# Patient Record
Sex: FEMALE | Race: WHITE | NOT HISPANIC OR LATINO | Employment: FULL TIME | URBAN - METROPOLITAN AREA
[De-identification: names, ages, dates, MRNs, and addresses within clinical notes are randomized per-mention and may not be internally consistent; named-entity substitution may affect disease eponyms.]

---

## 2017-11-21 ENCOUNTER — TRANSCRIBE ORDERS (OUTPATIENT)
Dept: ADMINISTRATIVE | Facility: HOSPITAL | Age: 47
End: 2017-11-21

## 2017-11-21 DIAGNOSIS — Z12.31 VISIT FOR SCREENING MAMMOGRAM: Primary | ICD-10-CM

## 2017-12-04 ENCOUNTER — OFFICE VISIT (OUTPATIENT)
Dept: OBSTETRICS AND GYNECOLOGY | Facility: CLINIC | Age: 47
End: 2017-12-04

## 2017-12-04 VITALS — DIASTOLIC BLOOD PRESSURE: 80 MMHG | WEIGHT: 176.8 LBS | SYSTOLIC BLOOD PRESSURE: 126 MMHG

## 2017-12-04 DIAGNOSIS — Z11.51 SPECIAL SCREENING EXAMINATION FOR HUMAN PAPILLOMAVIRUS (HPV): ICD-10-CM

## 2017-12-04 DIAGNOSIS — Z13.9 SCREENING FOR CONDITION: Primary | ICD-10-CM

## 2017-12-04 DIAGNOSIS — Z00.00 ANNUAL PHYSICAL EXAM: ICD-10-CM

## 2017-12-04 LAB
B-HCG UR QL: NEGATIVE
BILIRUB BLD-MCNC: NEGATIVE MG/DL
CLARITY, POC: CLEAR
COLOR UR: YELLOW
GLUCOSE UR STRIP-MCNC: NEGATIVE MG/DL
INTERNAL NEGATIVE CONTROL: NEGATIVE
INTERNAL POSITIVE CONTROL: POSITIVE
KETONES UR QL: NEGATIVE
LEUKOCYTE EST, POC: NEGATIVE
Lab: NORMAL
NITRITE UR-MCNC: NEGATIVE MG/ML
PH UR: 5 [PH] (ref 5–8)
PROT UR STRIP-MCNC: NEGATIVE MG/DL
RBC # UR STRIP: NEGATIVE /UL
SP GR UR: 1 (ref 1–1.03)
UROBILINOGEN UR QL: NORMAL

## 2017-12-04 PROCEDURE — 81025 URINE PREGNANCY TEST: CPT | Performed by: OBSTETRICS & GYNECOLOGY

## 2017-12-04 PROCEDURE — 81002 URINALYSIS NONAUTO W/O SCOPE: CPT | Performed by: OBSTETRICS & GYNECOLOGY

## 2017-12-04 PROCEDURE — 99396 PREV VISIT EST AGE 40-64: CPT | Performed by: OBSTETRICS & GYNECOLOGY

## 2017-12-04 RX ORDER — INFLUENZA VIRUS VACCINE 15; 15; 15; 15 UG/.5ML; UG/.5ML; UG/.5ML; UG/.5ML
SUSPENSION INTRAMUSCULAR
Refills: 0 | COMMUNITY
Start: 2017-10-07 | End: 2022-10-24

## 2017-12-04 RX ORDER — ESCITALOPRAM OXALATE 20 MG/1
TABLET ORAL
COMMUNITY
Start: 2017-11-23 | End: 2019-07-01 | Stop reason: SDUPTHER

## 2017-12-04 RX ORDER — PROPRANOLOL HYDROCHLORIDE 10 MG/1
10 TABLET ORAL
COMMUNITY
Start: 2017-12-04 | End: 2022-10-24

## 2017-12-04 RX ORDER — FLUTICASONE PROPIONATE 50 MCG
SPRAY, SUSPENSION (ML) NASAL
COMMUNITY
End: 2022-10-24

## 2017-12-04 NOTE — PROGRESS NOTES
GYN Annual Exam     CC- Here for annual exam.     Pt new to practice? no  Pt new to me? no           Yuki Fermin is a 47 y.o. female who presents for annual well woman exam.       Problems:  There is no problem list on file for this patient.  ; otherwise none    OB History      Para Term  AB Living      0       SAB TAB Ectopic Multiple Live Births                    Past Medical History:   Diagnosis Date   • Depression    • Palpitations        Past Surgical History:   Procedure Laterality Date   • CERVICAL BIOPSY  W/ LOOP ELECTRODE EXCISION     • RHINOPLASTY           Current Outpatient Prescriptions:   •  escitalopram (LEXAPRO) 20 MG tablet, TAKE 1 TABLET BY MOUTH EVERY DAY, Disp: , Rfl:   •  propranolol (INDERAL) 10 MG tablet, Take 10 mg by mouth., Disp: , Rfl:   •  FLUARIX QUADRIVALENT 0.5 ML suspension prefilled syringe injection, TO BE ADMINISTERED BY PHARMACIST FOR IMMUNIZATION, Disp: , Rfl: 0  •  fluticasone (FLONASE) 50 MCG/ACT nasal spray, into each nostril., Disp: , Rfl:     Allergies   Allergen Reactions   • Penicillins Itching   • Ciprofloxacin Rash       Social History   Substance Use Topics   • Smoking status: None   • Smokeless tobacco: None   • Alcohol use None     Counseling given: Not Answered      No family history on file.          /80  Wt 176 lb 12.8 oz (80.2 kg)  LMP 11/10/2017 (Exact Date)  Breastfeeding? No    Physical Exam   Constitutional: She is oriented to person, place, and time. She appears well-developed and well-nourished. No distress.   HENT:   Head: Normocephalic and atraumatic.   Eyes: EOM are normal.   Neck: Normal range of motion. Neck supple. No JVD present. No tracheal deviation present. No thyromegaly present.   Cardiovascular: Exam reveals no gallop and no friction rub.    No murmur heard.  Pulmonary/Chest: Effort normal. No stridor. No respiratory distress. She has no wheezes. She has no rales. She exhibits no tenderness.   Abdominal: Bowel  sounds are normal. She exhibits no distension and no mass. There is no tenderness. There is no rebound and no guarding. No hernia.   Genitourinary: Vagina normal and uterus normal. No vaginal discharge found.   Genitourinary Comments: cx wnl, pap done   Musculoskeletal: Normal range of motion. She exhibits no edema, tenderness or deformity.   Lymphadenopathy:     She has no cervical adenopathy.   Neurological: She is alert and oriented to person, place, and time.   Skin: Skin is warm and dry. No rash noted. She is not diaphoretic. No erythema. No pallor.   Breasts normal bilaterally   Psychiatric: She has a normal mood and affect. Her behavior is normal. Judgment and thought content normal.   Nursing note and vitals reviewed.       As part of wellness and prevention, the following topics were discussed with the patient:  []  Nutrition  []  Physical activity/regular exercise   []  Healthy weight  []  Injury prevention  []  Substance misuse/abuse  []  Sexual behavior  []  STD prevention  []  Contaception  []  Dental health  []  Mental health  []  Immunization  [x]  Encouraged SBE     Counseling and guidance done:  Nutrition, physical activity, healthy weight, injury prevention, misuse of tobacco, alcohol and drugs, sexual behavior and STDs, contraception, dental health, mental health, immunizations breast cancer screening and exams.      Assessment     1) GYN annual well woman exam.   2) PAP done today? yes  3) problems: none       Plan       Follow up prn and one year.    Yuki was seen today for gynecologic exam.    Diagnoses and all orders for this visit:    Screening for condition  -     POC Urinalysis Dipstick  -     POC Pregnancy, Urine    Special screening examination for human papillomavirus (HPV)  -     Pap IG, HPV-hr - ThinPrep Vial, Cervix        RTO No Follow-up on file.    Juan Elmore MD    12/4/2017  3:08 PM

## 2017-12-07 LAB
CYTOLOGIST CVX/VAG CYTO: NORMAL
CYTOLOGY CVX/VAG DOC THIN PREP: NORMAL
DX ICD CODE: NORMAL
HIV 1 & 2 AB SER-IMP: NORMAL
HPV I/H RISK 1 DNA CVX QL PROBE+SIG AMP: NEGATIVE
Lab: NORMAL
OTHER STN SPEC: NORMAL
PATH REPORT.FINAL DX SPEC: NORMAL
STAT OF ADQ CVX/VAG CYTO-IMP: NORMAL

## 2017-12-08 ENCOUNTER — HOSPITAL ENCOUNTER (OUTPATIENT)
Dept: MAMMOGRAPHY | Facility: HOSPITAL | Age: 47
Discharge: HOME | End: 2017-12-08
Attending: OBSTETRICS & GYNECOLOGY | Admitting: OBSTETRICS & GYNECOLOGY

## 2017-12-08 DIAGNOSIS — Z12.31 VISIT FOR SCREENING MAMMOGRAM: ICD-10-CM

## 2017-12-08 PROCEDURE — G0202 SCR MAMMO BI INCL CAD: HCPCS

## 2017-12-11 ENCOUNTER — TELEPHONE (OUTPATIENT)
Dept: OBSTETRICS AND GYNECOLOGY | Facility: CLINIC | Age: 47
End: 2017-12-11

## 2018-12-10 ENCOUNTER — OFFICE VISIT (OUTPATIENT)
Dept: OBSTETRICS AND GYNECOLOGY | Facility: CLINIC | Age: 48
End: 2018-12-10

## 2018-12-10 VITALS
BODY MASS INDEX: 27.25 KG/M2 | HEIGHT: 69 IN | DIASTOLIC BLOOD PRESSURE: 80 MMHG | SYSTOLIC BLOOD PRESSURE: 120 MMHG | WEIGHT: 184 LBS

## 2018-12-10 DIAGNOSIS — Z01.419 WELL WOMAN EXAM: Primary | ICD-10-CM

## 2018-12-10 LAB
BILIRUB BLD-MCNC: NEGATIVE MG/DL
CLARITY, POC: CLEAR
COLOR UR: YELLOW
GLUCOSE UR STRIP-MCNC: NEGATIVE MG/DL
KETONES UR QL: NEGATIVE
LEUKOCYTE EST, POC: NEGATIVE
NITRITE UR-MCNC: NEGATIVE MG/ML
PH UR: 5 [PH] (ref 5–8)
PROT UR STRIP-MCNC: NEGATIVE MG/DL
RBC # UR STRIP: NEGATIVE /UL
SP GR UR: 1 (ref 1–1.03)
UROBILINOGEN UR QL: NORMAL

## 2018-12-10 PROCEDURE — 99396 PREV VISIT EST AGE 40-64: CPT | Performed by: OBSTETRICS & GYNECOLOGY

## 2018-12-10 PROCEDURE — 81002 URINALYSIS NONAUTO W/O SCOPE: CPT | Performed by: OBSTETRICS & GYNECOLOGY

## 2018-12-10 NOTE — PROGRESS NOTES
"GYN Annual Exam     CC- Here for annual exam.     Pt new to practice? no  Pt new to me? no     HPI: History of Present Illness      Yuki Fermin is a 48 y.o. female who presents for annual well woman exam. No LMP recorded.      Problems:  There is no problem list on file for this patient.  ; otherwise none    OB History      Para Term  AB Living        0          SAB TAB Ectopic Molar Multiple Live Births                           Past Medical History:   Diagnosis Date   • Depression    • Palpitations        Past Surgical History:   Procedure Laterality Date   • CERVICAL BIOPSY  W/ LOOP ELECTRODE EXCISION     • RHINOPLASTY           Current Outpatient Medications:   •  escitalopram (LEXAPRO) 20 MG tablet, TAKE 1 TABLET BY MOUTH EVERY DAY, Disp: , Rfl:   •  FLUARIX QUADRIVALENT 0.5 ML suspension prefilled syringe injection, TO BE ADMINISTERED BY PHARMACIST FOR IMMUNIZATION, Disp: , Rfl: 0  •  fluticasone (FLONASE) 50 MCG/ACT nasal spray, into each nostril., Disp: , Rfl:   •  propranolol (INDERAL) 10 MG tablet, Take 10 mg by mouth., Disp: , Rfl:     Allergies   Allergen Reactions   • Penicillins Itching   • Ciprofloxacin Rash       Social History     Tobacco Use   • Smoking status: Not on file   Substance Use Topics   • Alcohol use: Not on file   • Drug use: Not on file     Counseling given: Not Answered      History reviewed. No pertinent family history.    Review of Systems    /80   Ht 175.3 cm (69\")   Wt 83.5 kg (184 lb)   BMI 27.17 kg/m²     Physical Exam   Constitutional: She is oriented to person, place, and time. She appears well-developed and well-nourished.   HENT:   Head: Normocephalic and atraumatic.   Eyes: EOM are normal.   Neck: Normal range of motion. Neck supple. No thyromegaly present.   Cardiovascular: Normal rate and regular rhythm.   Pulmonary/Chest: Effort normal and breath sounds normal. Right breast exhibits no mass and no nipple discharge. Left breast exhibits no " mass and no nipple discharge. Breasts are symmetrical. There is no breast swelling.   Abdominal: Soft. Bowel sounds are normal. She exhibits no distension and no mass. There is no tenderness. There is no rebound and no guarding.   Genitourinary: Vagina normal and uterus normal. No breast tenderness, discharge or bleeding. Pelvic exam was performed with patient prone. There is no lesion on the right labia. There is no lesion on the left labia. Cervix exhibits no motion tenderness and no discharge. Right adnexum displays no mass. Left adnexum displays no mass.   Genitourinary Comments: cx wnl, pap not done   Musculoskeletal: Normal range of motion. She exhibits no edema.   Neurological: She is alert and oriented to person, place, and time.   Skin: Skin is warm and dry.   Breasts wnl bilaterally   Psychiatric: She has a normal mood and affect. Her behavior is normal. Judgment and thought content normal.   Nursing note and vitals reviewed.         As part of wellness and prevention, the following topics were discussed with the patient:  []  Nutrition  []  Physical activity/regular exercise   []  Healthy weight  []  Injury prevention  []  Substance misuse/abuse  []  Sexual behavior  []  STD prevention  []  Contaception  []  Dental health  []  Mental health  []  Immunization  [x]  Encouraged SBE     Counseling and guidance done:  Nutrition, physical activity, healthy weight, injury prevention, misuse of tobacco, alcohol and drugs, sexual behavior and STDs, contraception, dental health, mental health, immunizations breast cancer screening and exams.    Assessment     1) GYN annual well woman exam.   2) PAP done today? no  3) problems: none       Plan       Follow up prn and one year.    Yuki was seen today for gynecologic exam.    Diagnoses and all orders for this visit:    Well woman exam  -     POC Urinalysis Dipstick  -     Mammo Screening Digital Tomosynthesis Bilateral With CAD; Future        RTO Return in about 1  year (around 12/10/2019) for Annual physical.        Juan Elmore MD    12/10/2018  9:38 AM

## 2019-02-04 ENCOUNTER — HOSPITAL ENCOUNTER (OUTPATIENT)
Dept: MAMMOGRAPHY | Facility: HOSPITAL | Age: 49
Discharge: HOME | End: 2019-02-04
Admitting: OBSTETRICS & GYNECOLOGY

## 2019-02-04 DIAGNOSIS — Z01.419 WELL WOMAN EXAM: ICD-10-CM

## 2019-02-04 PROCEDURE — 77067 SCR MAMMO BI INCL CAD: CPT

## 2019-02-04 PROCEDURE — 77063 BREAST TOMOSYNTHESIS BI: CPT

## 2019-07-01 ENCOUNTER — TELEPHONE (OUTPATIENT)
Dept: OBSTETRICS AND GYNECOLOGY | Facility: CLINIC | Age: 49
End: 2019-07-01

## 2019-07-01 RX ORDER — ESCITALOPRAM OXALATE 20 MG/1
20 TABLET ORAL DAILY
Qty: 30 TABLET | Refills: 1 | Status: SHIPPED | OUTPATIENT
Start: 2019-07-01 | End: 2019-08-22 | Stop reason: SDUPTHER

## 2019-07-01 NOTE — TELEPHONE ENCOUNTER
Pt called stating her therapist is moving out of state, she wants to know if you would be willing to write lexapro for her until she can find someone new. She will make an appt with you if she needs to.

## 2019-08-22 RX ORDER — ESCITALOPRAM OXALATE 20 MG/1
20 TABLET ORAL DAILY
Qty: 30 TABLET | Refills: 1 | Status: SHIPPED | OUTPATIENT
Start: 2019-08-22 | End: 2019-09-30 | Stop reason: SDUPTHER

## 2019-09-30 RX ORDER — ESCITALOPRAM OXALATE 20 MG/1
20 TABLET ORAL DAILY
Qty: 30 TABLET | Refills: 1 | Status: SHIPPED | OUTPATIENT
Start: 2019-09-30 | End: 2022-10-24

## 2022-10-24 ENCOUNTER — PREP FOR SURGERY (OUTPATIENT)
Dept: SURGERY | Facility: SURGERY CENTER | Age: 52
End: 2022-10-24

## 2022-10-24 ENCOUNTER — OFFICE VISIT (OUTPATIENT)
Dept: GASTROENTEROLOGY | Facility: CLINIC | Age: 52
End: 2022-10-24

## 2022-10-24 VITALS
HEART RATE: 91 BPM | TEMPERATURE: 97.3 F | HEIGHT: 69 IN | OXYGEN SATURATION: 99 % | BODY MASS INDEX: 25.09 KG/M2 | WEIGHT: 169.4 LBS | SYSTOLIC BLOOD PRESSURE: 138 MMHG | DIASTOLIC BLOOD PRESSURE: 84 MMHG

## 2022-10-24 DIAGNOSIS — Z86.010 HISTORY OF COLON POLYPS: Primary | ICD-10-CM

## 2022-10-24 DIAGNOSIS — D36.9 TUBULOVILLOUS ADENOMA: ICD-10-CM

## 2022-10-24 DIAGNOSIS — Z86.010 HISTORY OF COLON POLYPS: ICD-10-CM

## 2022-10-24 DIAGNOSIS — K59.00 CONSTIPATION, UNSPECIFIED CONSTIPATION TYPE: Primary | ICD-10-CM

## 2022-10-24 PROBLEM — Z86.0100 HISTORY OF COLON POLYPS: Status: ACTIVE | Noted: 2022-10-24

## 2022-10-24 PROCEDURE — 99204 OFFICE O/P NEW MOD 45 MIN: CPT | Performed by: NURSE PRACTITIONER

## 2022-10-24 RX ORDER — SODIUM CHLORIDE, SODIUM LACTATE, POTASSIUM CHLORIDE, CALCIUM CHLORIDE 600; 310; 30; 20 MG/100ML; MG/100ML; MG/100ML; MG/100ML
30 INJECTION, SOLUTION INTRAVENOUS CONTINUOUS PRN
Status: CANCELLED | OUTPATIENT
Start: 2022-10-24

## 2022-10-24 RX ORDER — QUETIAPINE FUMARATE 50 MG/1
TABLET, FILM COATED ORAL
COMMUNITY
Start: 2022-08-22

## 2022-10-24 RX ORDER — SODIUM CHLORIDE 0.9 % (FLUSH) 0.9 %
3 SYRINGE (ML) INJECTION EVERY 12 HOURS SCHEDULED
Status: CANCELLED | OUTPATIENT
Start: 2022-10-24

## 2022-10-24 RX ORDER — SODIUM CHLORIDE 0.9 % (FLUSH) 0.9 %
10 SYRINGE (ML) INJECTION AS NEEDED
Status: CANCELLED | OUTPATIENT
Start: 2022-10-24

## 2022-10-24 RX ORDER — DESVENLAFAXINE SUCCINATE 50 MG/1
TABLET, EXTENDED RELEASE ORAL
COMMUNITY
Start: 2022-08-22

## 2022-10-24 RX ORDER — ARIPIPRAZOLE 10 MG/1
TABLET ORAL
COMMUNITY
Start: 2022-10-06

## 2022-10-24 RX ORDER — FLUTICASONE PROPIONATE 50 MCG
1 SPRAY, SUSPENSION (ML) NASAL
COMMUNITY

## 2022-10-24 NOTE — PATIENT INSTRUCTIONS
For constipation, recommend starting MiraLAX daily available over-the-counter.  Mix 1 capful in 8 ounces of noncarbonated liquid and drink once daily.     Schedule colonoscopy for further evaluation.

## 2022-10-24 NOTE — PROGRESS NOTES
"Chief Complaint   Patient presents with   • Constipation         History of Present Illness  Patient is a 52-year-old female who presents today for evaluation.    Patient presents today with concerns about constipation.  Reports this began June 2022.  She is currently being treated for anxiety and depression and feels constipation has corresponded with medication she is taking for this.  She can go up to 3 days without having a bowel movement.  When she is constipated she has lower abdominal pain and bloating that improves after a bowel movement.    She denies any blood in the stool.    She has tried Metamucil which seemed to make her symptoms worse, as well as used an enema and tried smooth move tea.    Her last colonoscopy was November 2019.  She had 2 polyps on this exam, one tubulovillous adenoma and the other a tubular adenoma.    She denies any upper GI complaints.     Result Review :       TSH (08/03/2022 05:29)   CBC WITH AUTO DIFFERENTIAL (08/04/2022 06:38)   COMPREHENSIVE METABOLIC PANEL (08/04/2022 06:38)       Vital Signs:   /84   Pulse 91   Temp 97.3 °F (36.3 °C)   Ht 174 cm (68.5\")   Wt 76.8 kg (169 lb 6.4 oz)   SpO2 99%   BMI 25.38 kg/m²     Body mass index is 25.38 kg/m².     Physical Exam  Vitals reviewed.   Constitutional:       General: She is not in acute distress.     Appearance: She is well-developed.   HENT:      Head: Normocephalic and atraumatic.   Pulmonary:      Effort: Pulmonary effort is normal. No respiratory distress.   Abdominal:      General: Abdomen is flat. Bowel sounds are normal. There is no distension.      Palpations: Abdomen is soft.      Tenderness: There is abdominal tenderness in the right lower quadrant.   Skin:     General: Skin is dry.      Coloration: Skin is not pale.   Neurological:      Mental Status: She is alert and oriented to person, place, and time.   Psychiatric:         Thought Content: Thought content normal.           Assessment and Plan  "   Diagnoses and all orders for this visit:    1. Constipation, unspecified constipation type (Primary)    2. History of colon polyps    3. Tubulovillous adenoma         Discussion  Patient presents today for evaluation of constipation.  New problem.  Discussed with patient today feel it is likely a side effect to her medications based on the timing.  She has had lab work to evaluate her thyroid which was normal.  I do recommend an updated colonoscopy due to her history of polyps including a tubulovillous adenoma in 2019.    For treatment, recommended initiating MiraLAX daily.  If this does not help, patient was instructed to notify the office and we can provide prescription for constipation if needed.          Follow Up   Return for Follow up to review results after testing complete.    Patient Instructions   For constipation, recommend starting MiraLAX daily available over-the-counter.  Mix 1 capful in 8 ounces of noncarbonated liquid and drink once daily.     Schedule colonoscopy for further evaluation.

## 2022-11-21 ENCOUNTER — OFFICE VISIT (OUTPATIENT)
Dept: OBSTETRICS AND GYNECOLOGY | Facility: CLINIC | Age: 52
End: 2022-11-21

## 2022-11-21 VITALS
BODY MASS INDEX: 24.47 KG/M2 | DIASTOLIC BLOOD PRESSURE: 68 MMHG | WEIGHT: 165.2 LBS | SYSTOLIC BLOOD PRESSURE: 128 MMHG | HEIGHT: 69 IN

## 2022-11-21 DIAGNOSIS — Z11.51 SCREENING FOR HUMAN PAPILLOMAVIRUS: ICD-10-CM

## 2022-11-21 DIAGNOSIS — F41.9 ANXIETY: ICD-10-CM

## 2022-11-21 DIAGNOSIS — Z13.9 SCREENING FOR CONDITION: ICD-10-CM

## 2022-11-21 DIAGNOSIS — Z78.0 MENOPAUSE PRESENT: ICD-10-CM

## 2022-11-21 DIAGNOSIS — Z86.010 HISTORY OF COLON POLYPS: ICD-10-CM

## 2022-11-21 DIAGNOSIS — Z01.419 WELL WOMAN EXAM: ICD-10-CM

## 2022-11-21 DIAGNOSIS — D36.9 TUBULOVILLOUS ADENOMA: ICD-10-CM

## 2022-11-21 DIAGNOSIS — Z01.419 PAP SMEAR, LOW-RISK: Primary | ICD-10-CM

## 2022-11-21 PROCEDURE — 81002 URINALYSIS NONAUTO W/O SCOPE: CPT | Performed by: OBSTETRICS & GYNECOLOGY

## 2022-11-21 PROCEDURE — 99214 OFFICE O/P EST MOD 30 MIN: CPT | Performed by: OBSTETRICS & GYNECOLOGY

## 2022-11-21 PROCEDURE — 99386 PREV VISIT NEW AGE 40-64: CPT | Performed by: OBSTETRICS & GYNECOLOGY

## 2022-11-21 RX ORDER — FOLIC ACID 1 MG/1
0.5 TABLET ORAL DAILY
COMMUNITY

## 2022-11-21 RX ORDER — ESTRADIOL AND NORETHINDRONE ACETATE 1; .5 MG/1; MG/1
1 TABLET ORAL DAILY
Qty: 90 TABLET | Refills: 3 | Status: SHIPPED | OUTPATIENT
Start: 2022-11-21

## 2022-11-21 RX ORDER — ESTROGEN,CON/M-PROGEST ACET 0.625-2.5
1 TABLET ORAL DAILY
Qty: 90 TABLET | Refills: 3 | Status: SHIPPED | OUTPATIENT
Start: 2022-11-21

## 2022-11-21 RX ORDER — VILAZODONE HYDROCHLORIDE 20 MG/1
TABLET ORAL
COMMUNITY

## 2022-11-21 NOTE — PROGRESS NOTES
"EVALUATION AND MANAGEMENT ENCOUNTER    Yuki Fermin  Patient new to examiner? {yes and no:64236}  New problem to examiner? {yes and no:02217}  Patient referred? {yes and no:84716}    -----------------------------------------------------HISTORY---------------------------------------------------    Chief Complaint:   Chief Complaint   Patient presents with   • HRT       HPI:  Yuki Fermin is a 52 y.o. No obstetric history on file. with No LMP recorded. Patient is perimenopausal. here for  ***.      ROS:  Review of Systems:    ***Patient reports that she is not currently experiencing any symptoms of urinary incontinence.      ***TESTED FOR CHLAMYDIA?  -----------------------------------------------PHYSICAL EXAM----------------------------------------------    Vital Signs: /68   Ht 174 cm (68.5\")   Wt 74.9 kg (165 lb 3.2 oz)   BMI 24.75 kg/m²    Flowsheet Rows    Flowsheet Row First Filed Value   Admission Height 174 cm (68.5\") Documented at 11/21/2022 1022   Admission Weight 74.9 kg (165 lb 3.2 oz) Documented at 11/21/2022 1022          Physical Exam    I saw the patient with a face mask, gloves and eye protection  The patient herself was masked.  Social distancing was observed as appropriate. All COVID precautions observed.     -----------------------------------------------MEDICAL DECISION MAKING-----------------------------    IMPRESSION/PROBLEM:      ***    PLAN:     1.***  2.***    There are no diagnoses linked to this encounter.    Pt instructed to call for results of any testing done today if she does not hear from us, and that failure to do so could result in inadequate treatment . Pt verbalized her understanding.     RTO No follow-ups on file. FOR ***.  Instructions and precautions given.     I spent *** minutes caring for Yuki on this date of service. This time includes time spent by me in the following activities: {TIMEACTIVITIES:07558}    Juan Elmore MD  10:42 " EST  11/21/22

## 2022-11-21 NOTE — PROGRESS NOTES
GYN Annual Exam     CC- Here for annual exam   Chief Complaint   Patient presents with   • HRT       Pt new to practice? Yes  Pt new to me? Yes     Yuki Fermin is a 52 y.o. No obstetric history on file. female who presents for annual well woman exam. No LMP recorded. Patient is perimenopausal.    Problems in addition to need for annual: menopause symptoms, especially with anxiety, depression.  Pt is currently on meds for this, but wondered if HRT might help as well.  Pt has recently moved back into town.     HPI: History of Present Illness    PMHX:  Patient Active Problem List   Diagnosis   • History of colon polyps   • Tubulovillous adenoma   • Anxiety   • Menopause present   ; otherwise none    OB History             Para        Term   0            AB        Living           SAB        IAB        Ectopic        Molar        Multiple        Live Births                      Past Medical History:   Diagnosis Date   • Colon polyp    • Depression    • Hypertension 2022   • Irritable bowel syndrome    • Palpitations        Past Surgical History:   Procedure Laterality Date   • CERVICAL BIOPSY  W/ LOOP ELECTRODE EXCISION     • COLONOSCOPY     • RHINOPLASTY           Current Outpatient Medications:   •  ARIPiprazole (Abilify) 10 MG tablet, , Disp: , Rfl:   •  desvenlafaxine (Pristiq) 50 MG 24 hr tablet, , Disp: , Rfl:   •  estradiol-norethindrone (Activella) 1-0.5 MG per tablet, Take 1 tablet by mouth Daily., Disp: 90 tablet, Rfl: 3  •  estrogen, conjugated,-medroxyprogesterone (Prempro) 0.625-2.5 MG per tablet, Take 1 tablet by mouth Daily., Disp: 90 tablet, Rfl: 3  •  fluticasone (FLONASE) 50 MCG/ACT nasal spray, 1 spray into the nostril(s) as directed by provider., Disp: , Rfl:   •  folic acid (FOLVITE) 0.5 MG half tablet, Take 0.5 mg by mouth Daily., Disp: , Rfl:   •  QUEtiapine (SEROquel) 50 MG tablet, , Disp: , Rfl:   •  vilazodone (Viibryd) 20 MG tablet tablet, , Disp: , Rfl:  "    Allergies   Allergen Reactions   • Ciprofloxacin Rash     rash  rash     • Penicillins Itching and Rash     rash  rash         Social History     Tobacco Use   • Smoking status: Never   Substance Use Topics   • Alcohol use: Not Currently     Alcohol/week: 2.0 standard drinks     Types: 2 Cans of beer per week   • Drug use: Never       Yuki MOELLER Fermin  reports that she has never smoked. She does not have any smokeless tobacco history on file..           Family History   Problem Relation Age of Onset   • Colon polyps Sister    • Colon polyps Brother    • Colon cancer Neg Hx    • Crohn's disease Neg Hx    • Irritable bowel syndrome Neg Hx    • Ulcerative colitis Neg Hx        Review of Systems   Constitutional: Negative.    HENT: Negative.    Eyes: Negative.    Respiratory: Negative.    Cardiovascular: Negative.    Gastrointestinal: Negative.    Endocrine: Negative.    Musculoskeletal: Negative.    Skin: Negative.    Allergic/Immunologic: Negative.    Neurological: Negative.    Hematological: Negative.    Psychiatric/Behavioral: Positive for dysphoric mood.       Patient reports that she is not currently experiencing any symptoms of urinary incontinence.      noTESTED FOR CHLAMYDIA?    EXAM:  /68   Ht 174 cm (68.5\")   Wt 74.9 kg (165 lb 3.2 oz)   BMI 24.75 kg/m²     Labs:   Lab Results (last 24 hours)     ** No results found for the last 24 hours. **          Physical Exam  Vitals and nursing note reviewed. Exam conducted with a chaperone present.   Constitutional:       General: She is not in acute distress.     Appearance: She is well-developed. She is not diaphoretic.   HENT:      Head: Normocephalic and atraumatic.      Nose: Nose normal.   Eyes:      Extraocular Movements: Extraocular movements intact.   Cardiovascular:      Rate and Rhythm: Normal rate.   Pulmonary:      Effort: Pulmonary effort is normal.   Chest:   Breasts:     Breasts are symmetrical.      Right: Normal. No mass, nipple " discharge, skin change or tenderness.      Left: Normal. No mass, nipple discharge, skin change or tenderness.   Abdominal:      General: There is no distension.      Palpations: Abdomen is soft. There is no mass.      Tenderness: There is no abdominal tenderness. There is no guarding.   Genitourinary:     General: Normal vulva.      Pubic Area: No rash.       Vagina: Normal. No vaginal discharge.      Cervix: Normal.      Uterus: Normal.       Adnexa: Right adnexa normal and left adnexa normal.   Musculoskeletal:         General: No tenderness or deformity. Normal range of motion.      Cervical back: Normal range of motion.   Lymphadenopathy:      Upper Body:      Right upper body: No axillary adenopathy.      Left upper body: No axillary adenopathy.   Skin:     General: Skin is warm and dry.      Coloration: Skin is not pale.      Findings: No erythema or rash.   Neurological:      Mental Status: She is alert and oriented to person, place, and time.   Psychiatric:         Behavior: Behavior normal.         Thought Content: Thought content normal.         Judgment: Judgment normal.            As part of wellness and prevention, the following topics were discussed with the patient: healthy weight, substance abuse/misuse, mental health, encouraging self breast exam, and other counseling and guidance done:  Nutrition, physical activity, healthy weight, injury prevention, misuse of tobacco, alcohol and drugs, sexual behavior and STDs, contraception, dental health, mental health, immunizations breast cancer screening and exams.    I saw the patient with a face mask, gloves and eye protection  The patient herself was masked.  Social distancing was observed as appropriate. All COVID precautions observed.  Pt encouraged to receive COVID vaccine if she hadn't already done this.     Assessment     1) GYN annual well woman exam.   2) PAP done today? Yes  3) problems addressed: yes    MAMMOGRAM UP TO DATE IF AGE APPROPRIATE?   No    COLONOSCOPY UP TO DATE IF AGE APPROPRIATE? yes    Fhx breast cancer? No    Fhx ovarian cancer? No    Fhx colon cancer? No    Invitae testing offered? No           Plan       Follow up prn or one year.    Pt instructed to call for results of any testing done today and that failure to call if she has not heard from us could result in inadequate treatment.  Pt verbalized her understanding.     Diagnoses and all orders for this visit:    1. Screening for condition (Primary)  -     Mammo Screening Digital Tomosynthesis Bilateral With CAD; Future    2. Anxiety    3. Tubulovillous adenoma    4. History of colon polyps    5. Well woman exam    6. Menopause present    Other orders  -     estradiol-norethindrone (Activella) 1-0.5 MG per tablet; Take 1 tablet by mouth Daily.  Dispense: 90 tablet; Refill: 3  -     estrogen, conjugated,-medroxyprogesterone (Prempro) 0.625-2.5 MG per tablet; Take 1 tablet by mouth Daily.  Dispense: 90 tablet; Refill: 3        RTO Return in about 1 year (around 11/21/2023) for Annual physical.    I spent 30+ minutes on the separately reported service of evaluation and management of the menopause problem. This time is not included in the time used to support the annual exam service also reported today. . This time includes time spent by me in the following activities: preparing for the visit, reviewing tests, obtaining and/or reviewing a separately obtained history, performing a medically appropriate examination and/or evaluation, counseling and educating the patient/family/caregiver, ordering medications, tests, or procedures, referring and communicating with other health care professionals, documenting information in the medical record, independently interpreting results and communicating that information with the patient/family/caregiver and care coordination       Juan Elmore MD  11/21/22  11:14 EST

## 2022-11-22 ENCOUNTER — PATIENT ROUNDING (BHMG ONLY) (OUTPATIENT)
Dept: OBSTETRICS AND GYNECOLOGY | Facility: CLINIC | Age: 52
End: 2022-11-22

## 2022-11-22 NOTE — PROGRESS NOTES
A MY-CHART MESSAGE HAS BEEN SENT TO THE PATIENT FOR PATIENT ROUNDING WITH [facility] [place]

## 2022-11-29 LAB
CYTOLOGIST CVX/VAG CYTO: NORMAL
CYTOLOGY CVX/VAG DOC CYTO: NORMAL
CYTOLOGY CVX/VAG DOC THIN PREP: NORMAL
DX ICD CODE: NORMAL
HIV 1 & 2 AB SER-IMP: NORMAL
HPV I/H RISK 4 DNA CVX QL PROBE+SIG AMP: NEGATIVE
OTHER STN SPEC: NORMAL
STAT OF ADQ CVX/VAG CYTO-IMP: NORMAL

## 2022-12-20 ENCOUNTER — OFFICE VISIT (OUTPATIENT)
Dept: FAMILY MEDICINE CLINIC | Facility: CLINIC | Age: 52
End: 2022-12-20

## 2022-12-20 VITALS
RESPIRATION RATE: 16 BRPM | HEIGHT: 69 IN | WEIGHT: 170 LBS | HEART RATE: 79 BPM | TEMPERATURE: 97.7 F | SYSTOLIC BLOOD PRESSURE: 121 MMHG | BODY MASS INDEX: 25.18 KG/M2 | DIASTOLIC BLOOD PRESSURE: 80 MMHG | OXYGEN SATURATION: 99 %

## 2022-12-20 DIAGNOSIS — I10 PRIMARY HYPERTENSION: Primary | ICD-10-CM

## 2022-12-20 DIAGNOSIS — E55.9 VITAMIN D DEFICIENCY: ICD-10-CM

## 2022-12-20 DIAGNOSIS — F41.9 ANXIETY AND DEPRESSION: ICD-10-CM

## 2022-12-20 DIAGNOSIS — Z13.220 SCREENING CHOLESTEROL LEVEL: ICD-10-CM

## 2022-12-20 DIAGNOSIS — F32.A ANXIETY AND DEPRESSION: ICD-10-CM

## 2022-12-20 DIAGNOSIS — Z13.1 SCREENING FOR DIABETES MELLITUS: ICD-10-CM

## 2022-12-20 PROBLEM — R73.03 PREDIABETES: Status: ACTIVE | Noted: 2019-10-31

## 2022-12-20 PROBLEM — T50.902A SUICIDAL OVERDOSE, INITIAL ENCOUNTER (HCC): Status: ACTIVE | Noted: 2022-08-03

## 2022-12-20 PROBLEM — K58.9 IBS (IRRITABLE BOWEL SYNDROME): Status: ACTIVE | Noted: 2019-09-17

## 2022-12-20 PROBLEM — T46.5X1A CLONIDINE OVERDOSE: Status: ACTIVE | Noted: 2022-08-02

## 2022-12-20 PROCEDURE — 99203 OFFICE O/P NEW LOW 30 MIN: CPT | Performed by: PHYSICIAN ASSISTANT

## 2022-12-20 RX ORDER — AMLODIPINE BESYLATE 5 MG/1
5 TABLET ORAL DAILY
Qty: 90 TABLET | Refills: 3 | Status: SHIPPED | OUTPATIENT
Start: 2022-12-20

## 2022-12-20 RX ORDER — AMLODIPINE BESYLATE 5 MG/1
TABLET ORAL
COMMUNITY
Start: 2022-11-29 | End: 2022-12-20 | Stop reason: SDUPTHER

## 2022-12-20 RX ORDER — CLONAZEPAM 0.5 MG/1
TABLET ORAL
COMMUNITY
Start: 2022-11-21

## 2022-12-20 NOTE — PROGRESS NOTES
Subjective   Yuki Fermin is a 52 y.o. female.     History of Present Illness  Pt presents as a new patient to establish care and needing refill of her amlodipine for her hypertension.  She is supposed to have colonoscopy with Dr. Yap in January.  She sees Dr. Dash (gyn) in [place] for hormone replacement.  She is due for mammogram and is going to schedule this since she already has order.  She just had pap and everything was normal.  She is going to therapy once per week.         The following portions of the patient's history were reviewed and updated as appropriate: allergies, current medications, past family history, past medical history, past social history, past surgical history and problem list.  Past Medical History:   Diagnosis Date   • Anxiety    • Colon polyp 2019   • Depression    • Hypertension June 2022   • Irritable bowel syndrome 1994   • Palpitations      Past Surgical History:   Procedure Laterality Date   • CERVICAL BIOPSY  W/ LOOP ELECTRODE EXCISION     • COLONOSCOPY  2019   • RHINOPLASTY       Family History   Problem Relation Age of Onset   • Colon polyps Sister    • Arthritis Sister    • Colon polyps Brother    • Anxiety disorder Sister    • Depression Sister    • Diabetes Sister    • Colon cancer Neg Hx    • Crohn's disease Neg Hx    • Irritable bowel syndrome Neg Hx    • Ulcerative colitis Neg Hx      Social History     Socioeconomic History   • Marital status:    Tobacco Use   • Smoking status: Never   Vaping Use   • Vaping Use: Never used   Substance and Sexual Activity   • Alcohol use: Not Currently   • Drug use: Never   • Sexual activity: Not Currently     Partners: Male     Birth control/protection: Condom         Current Outpatient Medications:   •  amLODIPine (NORVASC) 5 MG tablet, Take 1 tablet by mouth Daily., Disp: 90 tablet, Rfl: 3  •  ARIPiprazole (ABILIFY) 10 MG tablet, , Disp: , Rfl:   •  clonazePAM (KlonoPIN) 0.5 MG tablet, , Disp: , Rfl:   •   "estradiol-norethindrone (Activella) 1-0.5 MG per tablet, Take 1 tablet by mouth Daily., Disp: 90 tablet, Rfl: 3  •  fluticasone (FLONASE) 50 MCG/ACT nasal spray, 1 spray into the nostril(s) as directed by provider., Disp: , Rfl:   •  QUEtiapine (SEROquel) 50 MG tablet, , Disp: , Rfl:   •  vilazodone (VIIBRYD) 20 MG tablet tablet, , Disp: , Rfl:   •  desvenlafaxine (PRISTIQ) 50 MG 24 hr tablet, , Disp: , Rfl:   •  estrogen, conjugated,-medroxyprogesterone (Prempro) 0.625-2.5 MG per tablet, Take 1 tablet by mouth Daily., Disp: 90 tablet, Rfl: 3  •  folic acid (FOLVITE) 0.5 MG half tablet, Take 0.5 mg by mouth Daily., Disp: , Rfl:     Review of Systems   Constitutional: Negative for activity change, appetite change, chills, diaphoresis, fatigue, fever, unexpected weight gain and unexpected weight loss.   Eyes: Negative for blurred vision and visual disturbance.   Respiratory: Negative for chest tightness and shortness of breath.    Cardiovascular: Negative for chest pain, palpitations and leg swelling.   Musculoskeletal: Negative for gait problem.   Neurological: Negative for dizziness, tremors, syncope, weakness, light-headedness, headache and confusion.     /80 (BP Location: Left arm, Patient Position: Sitting, Cuff Size: Large Adult)   Pulse 79   Temp 97.7 °F (36.5 °C) (Temporal)   Resp 16   Ht 175 cm (68.9\")   Wt 77.1 kg (170 lb)   SpO2 99%   BMI 25.18 kg/m²       Objective   Physical Exam  Vitals and nursing note reviewed.   Constitutional:       Appearance: Normal appearance. She is normal weight.   HENT:      Head: Normocephalic and atraumatic.   Eyes:      Pupils: Pupils are equal, round, and reactive to light.   Neck:      Vascular: No carotid bruit.   Cardiovascular:      Rate and Rhythm: Normal rate and regular rhythm.      Heart sounds: Normal heart sounds.   Pulmonary:      Effort: Pulmonary effort is normal.      Breath sounds: Normal breath sounds.   Abdominal:      General: Abdomen is flat. " Bowel sounds are normal.      Palpations: Abdomen is soft.   Musculoskeletal:      Cervical back: Normal range of motion and neck supple.      Right lower leg: No edema.      Left lower leg: No edema.   Skin:     General: Skin is warm and dry.   Neurological:      General: No focal deficit present.      Mental Status: She is alert and oriented to person, place, and time.   Psychiatric:         Mood and Affect: Mood normal.         Behavior: Behavior normal.         Thought Content: Thought content normal.         Procedures       Diagnoses and all orders for this visit:    1. Primary hypertension (Primary)  Comments:  Stable.  Continue Amlodipine and low sodium diet.  Orders:  -     amLODIPine (NORVASC) 5 MG tablet; Take 1 tablet by mouth Daily.  Dispense: 90 tablet; Refill: 3  -     TSH; Future    2. Screening cholesterol level  Comments:  Pt to get labs done at her convenience.  Orders:  -     Lipid Panel; Future    3. Screening for diabetes mellitus  Comments:  Pt to get labs done at her convenience.  Orders:  -     Comprehensive Metabolic Panel; Future    4. Vitamin D deficiency  Comments:  Not currently taking any vitamin D supplement.  Will check levels.    5. Anxiety and depression  Comments:  Will check labs to make sure nothing else is contributing.  Continue current medications and follow up with psychiatry.  Continue therapy weekly.  Orders:  -     TSH; Future  -     CBC & Differential; Future      I spent 30 minutes of patient care including reviewing pt's previous hx, reviewing current symptoms, performing exam, ordering tests, discussing treatment plan and completing my note.

## 2023-01-05 ENCOUNTER — APPOINTMENT (OUTPATIENT)
Dept: MAMMOGRAPHY | Facility: HOSPITAL | Age: 53
End: 2023-01-05
Payer: COMMERCIAL

## 2023-09-21 ENCOUNTER — TELEPHONE (OUTPATIENT)
Dept: GASTROENTEROLOGY | Facility: CLINIC | Age: 53
End: 2023-09-21
Payer: COMMERCIAL

## 2023-09-21 ENCOUNTER — OFFICE VISIT (OUTPATIENT)
Dept: FAMILY MEDICINE CLINIC | Facility: CLINIC | Age: 53
End: 2023-09-21
Payer: COMMERCIAL

## 2023-09-21 VITALS
SYSTOLIC BLOOD PRESSURE: 128 MMHG | OXYGEN SATURATION: 100 % | WEIGHT: 171.6 LBS | DIASTOLIC BLOOD PRESSURE: 84 MMHG | TEMPERATURE: 96.9 F | RESPIRATION RATE: 18 BRPM | BODY MASS INDEX: 25.42 KG/M2 | HEART RATE: 99 BPM | HEIGHT: 69 IN

## 2023-09-21 DIAGNOSIS — G47.00 INSOMNIA, UNSPECIFIED TYPE: Primary | ICD-10-CM

## 2023-09-21 DIAGNOSIS — N95.1 MENOPAUSAL AND FEMALE CLIMACTERIC STATES: ICD-10-CM

## 2023-09-21 DIAGNOSIS — K21.9 GASTROESOPHAGEAL REFLUX DISEASE, UNSPECIFIED WHETHER ESOPHAGITIS PRESENT: ICD-10-CM

## 2023-09-21 DIAGNOSIS — Z13.220 SCREENING CHOLESTEROL LEVEL: ICD-10-CM

## 2023-09-21 DIAGNOSIS — F32.A ANXIETY AND DEPRESSION: ICD-10-CM

## 2023-09-21 DIAGNOSIS — F41.9 ANXIETY AND DEPRESSION: ICD-10-CM

## 2023-09-21 DIAGNOSIS — E55.9 VITAMIN D DEFICIENCY: ICD-10-CM

## 2023-09-21 DIAGNOSIS — R10.84 GENERALIZED ABDOMINAL PAIN: ICD-10-CM

## 2023-09-21 RX ORDER — LAMOTRIGINE 25 MG/1
100 TABLET ORAL DAILY
COMMUNITY
Start: 2023-09-07

## 2023-09-21 RX ORDER — AMLODIPINE BESYLATE 5 MG/1
5 TABLET ORAL DAILY
COMMUNITY
Start: 2023-08-14

## 2023-09-21 RX ORDER — MIRTAZAPINE 15 MG/1
7.5 TABLET, FILM COATED ORAL NIGHTLY
COMMUNITY
Start: 2023-09-05

## 2023-09-21 RX ORDER — DOCUSATE SODIUM 100 MG/1
100 CAPSULE, LIQUID FILLED ORAL 2 TIMES DAILY
COMMUNITY
Start: 2023-08-14

## 2023-09-21 RX ORDER — VORTIOXETINE 10 MG/1
10 TABLET, FILM COATED ORAL
COMMUNITY
Start: 2023-09-05

## 2023-09-21 RX ORDER — PANTOPRAZOLE SODIUM 20 MG/1
20 TABLET, DELAYED RELEASE ORAL DAILY
Qty: 30 TABLET | Refills: 0 | Status: SHIPPED | OUTPATIENT
Start: 2023-09-21

## 2023-09-21 NOTE — PROGRESS NOTES
Subjective   Yuki Fermin is a 53 y.o. female.     History of Present Illness  Pt went back to [facility] for recovery.      She continues to have sleeping issues and it was suggested she get a sleep study.  She also continues to have stomach issues.  Has hx of IBS and polyps. She was supposed to go to have colonoscopy in January but it was cancelled and hasn't rescheduled it yet.  She has appointment with a psychiatrist in October.  She has also had some vaginal discharge with odor and is going to make an appointment with her ob/gyn.       Past Medical History:   Diagnosis Date    Anxiety     Colon polyp 2019    Depression     Hypertension June 2022    Irritable bowel syndrome 1994    Palpitations      Past Surgical History:   Procedure Laterality Date    CERVICAL BIOPSY  W/ LOOP ELECTRODE EXCISION      COLONOSCOPY  2019    RHINOPLASTY       Family History   Problem Relation Age of Onset    Colon polyps Sister     Arthritis Sister     Colon polyps Brother     Anxiety disorder Sister     Depression Sister     Diabetes Sister     Colon cancer Neg Hx     Crohn's disease Neg Hx     Irritable bowel syndrome Neg Hx     Ulcerative colitis Neg Hx      Social History     Socioeconomic History    Marital status:    Tobacco Use    Smoking status: Never     Passive exposure: Never    Smokeless tobacco: Never   Vaping Use    Vaping Use: Never used   Substance and Sexual Activity    Alcohol use: Not Currently    Drug use: Never    Sexual activity: Not Currently     Partners: Male     Birth control/protection: Condom         Current Outpatient Medications:     amLODIPine (NORVASC) 5 MG tablet, Take 1 tablet by mouth Daily., Disp: , Rfl:     docusate sodium (COLACE) 100 MG capsule, Take 1 capsule by mouth 2 (Two) Times a Day., Disp: , Rfl:     lamoTRIgine (LaMICtal) 25 MG tablet, Take 4 tablets by mouth Daily., Disp: , Rfl:     mirtazapine (REMERON) 15 MG tablet, Take 0.5 tablets by mouth Every Night., Disp: ,  "Rfl:     Trintellix 10 MG tablet tablet, Take 1 tablet by mouth Daily With Breakfast., Disp: , Rfl:     pantoprazole (Protonix) 20 MG EC tablet, Take 1 tablet by mouth Daily., Disp: 30 tablet, Rfl: 0    Review of Systems   Constitutional:  Positive for fatigue. Negative for activity change, appetite change, chills, diaphoresis, fever, unexpected weight gain and unexpected weight loss.   Eyes:  Negative for blurred vision and visual disturbance.   Respiratory:  Negative for chest tightness, shortness of breath and wheezing.    Cardiovascular:  Negative for chest pain, palpitations and leg swelling.   Gastrointestinal:  Positive for abdominal pain, constipation, diarrhea, nausea and GERD. Negative for vomiting.   Musculoskeletal:  Negative for gait problem.   Neurological:  Negative for dizziness, tremors, syncope, weakness, light-headedness, headache and confusion.   Psychiatric/Behavioral:  Positive for sleep disturbance, depressed mood and stress. Negative for self-injury and suicidal ideas. The patient is nervous/anxious and has insomnia.    /84 (BP Location: Left arm, Patient Position: Sitting, Cuff Size: Adult)   Pulse 99   Temp 96.9 °F (36.1 °C) (Temporal)   Resp 18   Ht 175 cm (68.9\")   Wt 77.8 kg (171 lb 9.6 oz)   SpO2 100%   BMI 25.42 kg/m²       Objective   Physical Exam  Vitals and nursing note reviewed.   Constitutional:       General: She is not in acute distress.     Appearance: Normal appearance. She is normal weight.   HENT:      Head: Normocephalic and atraumatic.   Neck:      Thyroid: No thyroid mass, thyromegaly or thyroid tenderness.   Cardiovascular:      Rate and Rhythm: Normal rate and regular rhythm.      Heart sounds: Normal heart sounds. No murmur heard.  Pulmonary:      Effort: Pulmonary effort is normal. No respiratory distress.      Breath sounds: Normal breath sounds. No wheezing, rhonchi or rales.   Abdominal:      General: Abdomen is flat. Bowel sounds are normal. There is " no distension.      Palpations: Abdomen is soft. There is no mass.      Tenderness: There is abdominal tenderness in the left lower quadrant. There is no right CVA tenderness or left CVA tenderness.   Musculoskeletal:      Cervical back: Normal range of motion and neck supple.      Right lower leg: No edema.      Left lower leg: No edema.   Skin:     General: Skin is warm and dry.   Neurological:      General: No focal deficit present.      Mental Status: She is alert and oriented to person, place, and time.   Psychiatric:         Mood and Affect: Mood normal.         Behavior: Behavior normal.         Thought Content: Thought content normal.       Procedures     Assessment    Diagnoses and all orders for this visit:    1. Insomnia, unspecified type (Primary)  Comments:  Order for sleep study entered.  Consider trying zyprexa low dose in the future if still not sleeping well once on goal dose of lamitcal.  Orders:  -     Ambulatory Referral to Sleep Medicine  -     Cortisol - AM; Future  -     TSH; Future    2. Generalized abdominal pain  Comments:  Will check labs and determine if any imaging needs to be done.  Schedule appointment with gastroenterology. Pain in upper and lower abdomen at times.  Orders:  -     Comprehensive metabolic panel; Future  -     Lipase; Future  -     CBC w AUTO Differential; Future    3. Menopausal and female climacteric states  Comments:  Will check labs.  Get mammogram done as previously ordered.  Orders:  -     FSH & LH; Future  -     Estradiol; Future  -     Progesterone; Future    4. Screening cholesterol level  Comments:  Will check fasting labs.  Orders:  -     Lipid panel; Future    5. Vitamin D deficiency  Comments:  Will check labs.  Orders:  -     Vitamin D 25 hydroxy; Future    6. Gastroesophageal reflux disease, unspecified whether esophagitis present  Comments:  Will try pantoprazole. Follow up with GI.  Orders:  -     pantoprazole (Protonix) 20 MG EC tablet; Take 1 tablet  by mouth Daily.  Dispense: 30 tablet; Refill: 0    7. Anxiety and depression  Comments:  Continue current regimen.  Follow up with psychiatry as scheduled.  Referral entered for psychology.  Orders:  -     Ambulatory Referral to Psychology                I spent 38 minutes caring for Yuki on this date of service. This time includes time spent by me in the following activities: preparing for the visit, reviewing tests, obtaining and/or reviewing a separately obtained history, performing a medically appropriate examination and/or evaluation, counseling and educating the patient/family/caregiver, ordering medications, tests, or procedures, referring and communicating with other health care professionals, and documenting information in the medical record.

## 2023-09-22 ENCOUNTER — PREP FOR SURGERY (OUTPATIENT)
Dept: SURGERY | Facility: SURGERY CENTER | Age: 53
End: 2023-09-22

## 2023-09-22 ENCOUNTER — LAB (OUTPATIENT)
Dept: FAMILY MEDICINE CLINIC | Facility: CLINIC | Age: 53
End: 2023-09-22
Payer: COMMERCIAL

## 2023-09-22 DIAGNOSIS — Z13.220 SCREENING CHOLESTEROL LEVEL: ICD-10-CM

## 2023-09-22 DIAGNOSIS — Z86.010 HISTORY OF COLON POLYPS: ICD-10-CM

## 2023-09-22 DIAGNOSIS — N95.1 MENOPAUSAL AND FEMALE CLIMACTERIC STATES: ICD-10-CM

## 2023-09-22 DIAGNOSIS — E55.9 VITAMIN D DEFICIENCY: ICD-10-CM

## 2023-09-22 DIAGNOSIS — G47.00 INSOMNIA, UNSPECIFIED TYPE: ICD-10-CM

## 2023-09-22 DIAGNOSIS — R10.84 GENERALIZED ABDOMINAL PAIN: ICD-10-CM

## 2023-09-22 DIAGNOSIS — Z12.11 ENCOUNTER FOR SCREENING COLONOSCOPY: Primary | ICD-10-CM

## 2023-09-22 LAB
25(OH)D3 SERPL-MCNC: 26.7 NG/ML (ref 30–100)
ALBUMIN SERPL-MCNC: 4.8 G/DL (ref 3.5–5.2)
ALBUMIN/GLOB SERPL: 1.8 G/DL
ALP SERPL-CCNC: 71 U/L (ref 39–117)
ALT SERPL W P-5'-P-CCNC: 14 U/L (ref 1–33)
ANION GAP SERPL CALCULATED.3IONS-SCNC: 10.5 MMOL/L (ref 5–15)
AST SERPL-CCNC: 16 U/L (ref 1–32)
BASOPHILS # BLD AUTO: 0.06 10*3/MM3 (ref 0–0.2)
BASOPHILS NFR BLD AUTO: 0.7 % (ref 0–1.5)
BILIRUB SERPL-MCNC: 0.5 MG/DL (ref 0–1.2)
BUN SERPL-MCNC: 13 MG/DL (ref 6–20)
BUN/CREAT SERPL: 16.7 (ref 7–25)
CALCIUM SPEC-SCNC: 9.6 MG/DL (ref 8.6–10.5)
CHLORIDE SERPL-SCNC: 102 MMOL/L (ref 98–107)
CHOLEST SERPL-MCNC: 253 MG/DL (ref 0–200)
CO2 SERPL-SCNC: 27.5 MMOL/L (ref 22–29)
CORTIS SERPL-MCNC: 10.6 MCG/DL
CREAT SERPL-MCNC: 0.78 MG/DL (ref 0.57–1)
DEPRECATED RDW RBC AUTO: 40.8 FL (ref 37–54)
EGFRCR SERPLBLD CKD-EPI 2021: 91 ML/MIN/1.73
EOSINOPHIL # BLD AUTO: 0.06 10*3/MM3 (ref 0–0.4)
EOSINOPHIL NFR BLD AUTO: 0.7 % (ref 0.3–6.2)
ERYTHROCYTE [DISTWIDTH] IN BLOOD BY AUTOMATED COUNT: 12.6 % (ref 12.3–15.4)
ESTRADIOL SERPL HS-MCNC: 69.8 PG/ML
FSH SERPL-ACNC: 43.9 MIU/ML
GLOBULIN UR ELPH-MCNC: 2.7 GM/DL
GLUCOSE SERPL-MCNC: 100 MG/DL (ref 65–99)
HCT VFR BLD AUTO: 38.7 % (ref 34–46.6)
HDLC SERPL-MCNC: 53 MG/DL (ref 40–60)
HGB BLD-MCNC: 13.3 G/DL (ref 12–15.9)
IMM GRANULOCYTES # BLD AUTO: 0.02 10*3/MM3 (ref 0–0.05)
IMM GRANULOCYTES NFR BLD AUTO: 0.2 % (ref 0–0.5)
LDLC SERPL CALC-MCNC: 177 MG/DL (ref 0–100)
LDLC/HDLC SERPL: 3.29 {RATIO}
LH SERPL-ACNC: 30.7 MIU/ML
LIPASE SERPL-CCNC: 40 U/L (ref 13–60)
LYMPHOCYTES # BLD AUTO: 1.59 10*3/MM3 (ref 0.7–3.1)
LYMPHOCYTES NFR BLD AUTO: 18.4 % (ref 19.6–45.3)
MCH RBC QN AUTO: 30.8 PG (ref 26.6–33)
MCHC RBC AUTO-ENTMCNC: 34.4 G/DL (ref 31.5–35.7)
MCV RBC AUTO: 89.6 FL (ref 79–97)
MONOCYTES # BLD AUTO: 0.46 10*3/MM3 (ref 0.1–0.9)
MONOCYTES NFR BLD AUTO: 5.3 % (ref 5–12)
NEUTROPHILS NFR BLD AUTO: 6.44 10*3/MM3 (ref 1.7–7)
NEUTROPHILS NFR BLD AUTO: 74.7 % (ref 42.7–76)
NRBC BLD AUTO-RTO: 0 /100 WBC (ref 0–0.2)
PLATELET # BLD AUTO: 290 10*3/MM3 (ref 140–450)
PMV BLD AUTO: 10.3 FL (ref 6–12)
POTASSIUM SERPL-SCNC: 4.2 MMOL/L (ref 3.5–5.2)
PROGEST SERPL-MCNC: 0.13 NG/ML
PROT SERPL-MCNC: 7.5 G/DL (ref 6–8.5)
RBC # BLD AUTO: 4.32 10*6/MM3 (ref 3.77–5.28)
SODIUM SERPL-SCNC: 140 MMOL/L (ref 136–145)
TRIGL SERPL-MCNC: 129 MG/DL (ref 0–150)
TSH SERPL DL<=0.05 MIU/L-ACNC: 1.55 UIU/ML (ref 0.27–4.2)
VLDLC SERPL-MCNC: 23 MG/DL (ref 5–40)
WBC NRBC COR # BLD: 8.63 10*3/MM3 (ref 3.4–10.8)

## 2023-09-22 PROCEDURE — 36415 COLL VENOUS BLD VENIPUNCTURE: CPT

## 2023-09-22 PROCEDURE — 82533 TOTAL CORTISOL: CPT | Performed by: PHYSICIAN ASSISTANT

## 2023-09-22 PROCEDURE — 80061 LIPID PANEL: CPT | Performed by: PHYSICIAN ASSISTANT

## 2023-09-22 PROCEDURE — 84443 ASSAY THYROID STIM HORMONE: CPT | Performed by: PHYSICIAN ASSISTANT

## 2023-09-22 PROCEDURE — 80053 COMPREHEN METABOLIC PANEL: CPT | Performed by: PHYSICIAN ASSISTANT

## 2023-09-22 PROCEDURE — 85025 COMPLETE CBC W/AUTO DIFF WBC: CPT | Performed by: PHYSICIAN ASSISTANT

## 2023-09-22 PROCEDURE — 83001 ASSAY OF GONADOTROPIN (FSH): CPT | Performed by: PHYSICIAN ASSISTANT

## 2023-09-22 PROCEDURE — 83690 ASSAY OF LIPASE: CPT | Performed by: PHYSICIAN ASSISTANT

## 2023-09-22 PROCEDURE — 84144 ASSAY OF PROGESTERONE: CPT | Performed by: PHYSICIAN ASSISTANT

## 2023-09-22 PROCEDURE — 83002 ASSAY OF GONADOTROPIN (LH): CPT | Performed by: PHYSICIAN ASSISTANT

## 2023-09-22 PROCEDURE — 82670 ASSAY OF TOTAL ESTRADIOL: CPT | Performed by: PHYSICIAN ASSISTANT

## 2023-09-22 PROCEDURE — 82306 VITAMIN D 25 HYDROXY: CPT | Performed by: PHYSICIAN ASSISTANT

## 2023-09-23 DIAGNOSIS — E78.00 HYPERCHOLESTEROLEMIA: Primary | ICD-10-CM

## 2023-09-27 ENCOUNTER — PREP FOR SURGERY (OUTPATIENT)
Dept: SURGERY | Facility: SURGERY CENTER | Age: 53
End: 2023-09-27

## 2023-09-27 DIAGNOSIS — Z12.11 ENCOUNTER FOR SCREENING COLONOSCOPY: Primary | ICD-10-CM

## 2023-09-27 DIAGNOSIS — Z86.010 HISTORY OF COLON POLYPS: ICD-10-CM

## 2023-09-27 RX ORDER — SODIUM CHLORIDE, SODIUM LACTATE, POTASSIUM CHLORIDE, CALCIUM CHLORIDE 600; 310; 30; 20 MG/100ML; MG/100ML; MG/100ML; MG/100ML
30 INJECTION, SOLUTION INTRAVENOUS CONTINUOUS PRN
Status: CANCELLED | OUTPATIENT
Start: 2023-09-27

## 2023-09-28 PROBLEM — Z12.11 ENCOUNTER FOR SCREENING COLONOSCOPY: Status: ACTIVE | Noted: 2023-09-28

## 2023-11-02 ENCOUNTER — HOSPITAL ENCOUNTER (OUTPATIENT)
Facility: SURGERY CENTER | Age: 53
Setting detail: HOSPITAL OUTPATIENT SURGERY
Discharge: HOME | End: 2023-11-02
Attending: INTERNAL MEDICINE | Admitting: INTERNAL MEDICINE
Payer: COMMERCIAL

## 2023-11-02 ENCOUNTER — ANESTHESIA EVENT (OUTPATIENT)
Dept: SURGERY | Facility: SURGERY CENTER | Age: 53
End: 2023-11-02
Payer: COMMERCIAL

## 2023-11-02 ENCOUNTER — ANESTHESIA (OUTPATIENT)
Dept: SURGERY | Facility: SURGERY CENTER | Age: 53
End: 2023-11-02
Payer: COMMERCIAL

## 2023-11-02 VITALS
DIASTOLIC BLOOD PRESSURE: 83 MMHG | BODY MASS INDEX: 24.44 KG/M2 | RESPIRATION RATE: 16 BRPM | TEMPERATURE: 97.5 F | OXYGEN SATURATION: 100 % | SYSTOLIC BLOOD PRESSURE: 144 MMHG | WEIGHT: 165 LBS | HEART RATE: 81 BPM | HEIGHT: 69 IN

## 2023-11-02 DIAGNOSIS — Z12.11 ENCOUNTER FOR SCREENING COLONOSCOPY: ICD-10-CM

## 2023-11-02 DIAGNOSIS — Z86.010 HISTORY OF COLON POLYPS: ICD-10-CM

## 2023-11-02 PROCEDURE — 45385 COLONOSCOPY W/LESION REMOVAL: CPT | Performed by: INTERNAL MEDICINE

## 2023-11-02 PROCEDURE — 45380 COLONOSCOPY AND BIOPSY: CPT | Performed by: INTERNAL MEDICINE

## 2023-11-02 PROCEDURE — 25810000003 LACTATED RINGERS PER 1000 ML: Performed by: INTERNAL MEDICINE

## 2023-11-02 PROCEDURE — 88305 TISSUE EXAM BY PATHOLOGIST: CPT | Performed by: INTERNAL MEDICINE

## 2023-11-02 PROCEDURE — 25010000002 PROPOFOL 10 MG/ML EMULSION: Performed by: REGISTERED NURSE

## 2023-11-02 RX ORDER — SODIUM CHLORIDE 0.9 % (FLUSH) 0.9 %
10 SYRINGE (ML) INJECTION AS NEEDED
Status: DISCONTINUED | OUTPATIENT
Start: 2023-11-02 | End: 2023-11-02 | Stop reason: HOSPADM

## 2023-11-02 RX ORDER — LIDOCAINE HYDROCHLORIDE 20 MG/ML
INJECTION, SOLUTION INFILTRATION; PERINEURAL AS NEEDED
Status: DISCONTINUED | OUTPATIENT
Start: 2023-11-02 | End: 2023-11-02 | Stop reason: SURG

## 2023-11-02 RX ORDER — SODIUM CHLORIDE, SODIUM LACTATE, POTASSIUM CHLORIDE, CALCIUM CHLORIDE 600; 310; 30; 20 MG/100ML; MG/100ML; MG/100ML; MG/100ML
30 INJECTION, SOLUTION INTRAVENOUS CONTINUOUS PRN
Status: DISCONTINUED | OUTPATIENT
Start: 2023-11-02 | End: 2023-11-02 | Stop reason: HOSPADM

## 2023-11-02 RX ORDER — SODIUM CHLORIDE, SODIUM LACTATE, POTASSIUM CHLORIDE, CALCIUM CHLORIDE 600; 310; 30; 20 MG/100ML; MG/100ML; MG/100ML; MG/100ML
1000 INJECTION, SOLUTION INTRAVENOUS CONTINUOUS
Status: DISCONTINUED | OUTPATIENT
Start: 2023-11-02 | End: 2023-11-02 | Stop reason: HOSPADM

## 2023-11-02 RX ORDER — LIDOCAINE HYDROCHLORIDE 10 MG/ML
0.5 INJECTION, SOLUTION INFILTRATION; PERINEURAL ONCE AS NEEDED
Status: DISCONTINUED | OUTPATIENT
Start: 2023-11-02 | End: 2023-11-02 | Stop reason: HOSPADM

## 2023-11-02 RX ORDER — PROPOFOL 10 MG/ML
VIAL (ML) INTRAVENOUS AS NEEDED
Status: DISCONTINUED | OUTPATIENT
Start: 2023-11-02 | End: 2023-11-02 | Stop reason: SURG

## 2023-11-02 RX ADMIN — SODIUM CHLORIDE, POTASSIUM CHLORIDE, SODIUM LACTATE AND CALCIUM CHLORIDE 1000 ML: 600; 310; 30; 20 INJECTION, SOLUTION INTRAVENOUS at 10:07

## 2023-11-02 RX ADMIN — LIDOCAINE HYDROCHLORIDE 60 MG: 20 INJECTION, SOLUTION INFILTRATION; PERINEURAL at 10:52

## 2023-11-02 RX ADMIN — PROPOFOL 180 MCG/KG/MIN: 10 INJECTION, EMULSION INTRAVENOUS at 10:52

## 2023-11-02 RX ADMIN — PROPOFOL 60 MG: 10 INJECTION, EMULSION INTRAVENOUS at 10:52

## 2023-11-02 NOTE — ANESTHESIA POSTPROCEDURE EVALUATION
Patient: Yuki Fermin    Procedure Summary       Date: 11/02/23 Room / Location: [facility]    Anesthesia Start: 1050 Anesthesia Stop: 1111    Procedure: COLONOSCOPY to cecum with snare,bx polypectomy Diagnosis:       Encounter for screening colonoscopy      History of colon polyps      (Encounter for screening colonoscopy [Z12.11])      (History of colon polyps [Z86.010])    Surgeons: Keenan Yap MD Provider: Jabari Garvey MD    Anesthesia Type: MAC ASA Status: 2            Anesthesia Type: MAC    Vitals  No vitals data found for the desired time range.          Post Anesthesia Care and Evaluation    Patient location during evaluation: bedside  Patient participation: complete - patient participated  Level of consciousness: responsive to light touch, responsive to verbal stimuli and sleepy but conscious  Pain score: 0  Pain management: adequate    Airway patency: patent  Anesthetic complications: No anesthetic complications  PONV Status: none  Cardiovascular status: acceptable and hemodynamically stable  Respiratory status: acceptable  Hydration status: acceptable

## 2023-11-02 NOTE — H&P
No chief complaint on file.      HPI  Patient today for screening colonoscopy.  She has a history of colon polyp.         Problem List:    Patient Active Problem List   Diagnosis    History of colon polyps    Tubulovillous adenoma    Anxiety    Menopause present    Anxiety and depression    Clonidine overdose    IBS (irritable bowel syndrome)    Prediabetes    Suicidal overdose, initial encounter    Encounter for screening colonoscopy       Medical History:    Past Medical History:   Diagnosis Date    Anxiety     Colon polyp 2019    Depression     Hypertension June 2022    Irritable bowel syndrome 1994    Palpitations         Social History:    Social History     Socioeconomic History    Marital status:    Tobacco Use    Smoking status: Never     Passive exposure: Never    Smokeless tobacco: Never   Vaping Use    Vaping Use: Never used   Substance and Sexual Activity    Alcohol use: Not Currently    Drug use: Never    Sexual activity: Not Currently     Partners: Male     Birth control/protection: Condom       Family History:   Family History   Problem Relation Age of Onset    Colon polyps Sister     Arthritis Sister     Colon polyps Brother     Anxiety disorder Sister     Depression Sister     Diabetes Sister     Colon cancer Neg Hx     Crohn's disease Neg Hx     Irritable bowel syndrome Neg Hx     Ulcerative colitis Neg Hx        Surgical History:   Past Surgical History:   Procedure Laterality Date    CERVICAL BIOPSY  W/ LOOP ELECTRODE EXCISION      COLONOSCOPY  2019    RHINOPLASTY         No current facility-administered medications for this encounter.    Allergies:   Allergies   Allergen Reactions    Ciprofloxacin Rash     rash  rash      Penicillins Itching and Rash     rash  rash          The following portions of the patient's history were reviewed by me and updated as appropriate: review of systems, allergies, current medications, past family history, past medical history, past social history, past  surgical history and problem list.    There were no vitals filed for this visit.    PHYSICAL EXAM:    CONSTITUTIONAL:  today's vital signs reviewed by me  GASTROINTESTINAL: abdomen is soft nontender nondistended with normal active bowel sounds, no masses are appreciated    Assessment/ Plan  We will proceed with colonoscopy.    Risks and benefits as well as alternatives to endoscopic evaluation were explained to the patient and they voiced understanding and wish to proceed.  These risks include but are not limited to the risk of bleeding, perforation, adverse reaction to sedation, and missed lesions.  The patient was given the opportunity to ask questions prior to the endoscopic procedure.

## 2023-11-02 NOTE — ANESTHESIA PREPROCEDURE EVALUATION
Anesthesia Evaluation     Patient summary reviewed and Nursing notes reviewed   NPO Solid Status: > 8 hours  NPO Liquid Status: > 2 hours           Airway   Mallampati: II  TM distance: >3 FB  Neck ROM: full  no difficulty expected  Dental - normal exam     Pulmonary - normal exam   (-) COPD, asthma, not a smoker, lung cancer  Cardiovascular - normal exam  Exercise tolerance: excellent (>7 METS)    Rhythm: regular  Rate: normal    (+) hypertension well controlled less than 2 medications  (-) valvular problems/murmurs, past MI, CAD, dysrhythmias, angina, CHF, cardiac stents, CABG, pericardial effusion      Neuro/Psych  (+) psychiatric history Anxiety and Depression  (-) seizures, TIA, CVA  GI/Hepatic/Renal/Endo    (+) GERD well controlled  (-) hiatal hernia, PUD, hepatitis, liver disease, no renal disease, diabetes, GI bleed, no thyroid disorder    Musculoskeletal     Abdominal  - normal exam   Substance History      OB/GYN          Other                    Anesthesia Plan    ASA 2     MAC     intravenous induction     Anesthetic plan, risks, benefits, and alternatives have been provided, discussed and informed consent has been obtained with: patient.    CODE STATUS:

## 2023-11-03 LAB
LAB AP CASE REPORT: NORMAL
LAB AP CLINICAL INFORMATION: NORMAL
PATH REPORT.FINAL DX SPEC: NORMAL
PATH REPORT.GROSS SPEC: NORMAL

## 2023-11-13 ENCOUNTER — OFFICE VISIT (OUTPATIENT)
Dept: SLEEP MEDICINE | Facility: CLINIC | Age: 53
End: 2023-11-13
Payer: COMMERCIAL

## 2023-11-13 VITALS
HEART RATE: 87 BPM | OXYGEN SATURATION: 99 % | DIASTOLIC BLOOD PRESSURE: 70 MMHG | BODY MASS INDEX: 25.85 KG/M2 | SYSTOLIC BLOOD PRESSURE: 122 MMHG | HEIGHT: 68 IN | WEIGHT: 170.6 LBS

## 2023-11-13 DIAGNOSIS — F41.8 ANXIETY WITH DEPRESSION: ICD-10-CM

## 2023-11-13 DIAGNOSIS — G47.10 HYPERSOMNIA: ICD-10-CM

## 2023-11-13 DIAGNOSIS — G47.00 INSOMNIA, UNSPECIFIED TYPE: Primary | ICD-10-CM

## 2023-11-13 DIAGNOSIS — G47.8 NON-RESTORATIVE SLEEP: ICD-10-CM

## 2023-11-13 DIAGNOSIS — E66.3 OVERWEIGHT WITH BODY MASS INDEX (BMI) 25.0-29.9: ICD-10-CM

## 2023-11-13 PROCEDURE — G0463 HOSPITAL OUTPT CLINIC VISIT: HCPCS

## 2023-11-13 RX ORDER — CLONAZEPAM 0.5 MG/1
0.5 TABLET, ORALLY DISINTEGRATING ORAL NIGHTLY
Qty: 30 TABLET | Refills: 0 | Status: SHIPPED | OUTPATIENT
Start: 2023-11-13

## 2023-11-13 NOTE — PROGRESS NOTES
[facility] New Patient/Consultation       Reason for Consultation: insomnia      Patient Care Team:  Domonique Martin PA as PCP - General (Physician Assistant)  Celena Vincent APRN as Nurse Practitioner (Nurse Practitioner)  Frankel, Ora, MD (Psychiatry)  Juan Elmore MD as Consulting Physician (Obstetrics and Gynecology)  Naveen Baker MD as Consulting Physician (Sleep Medicine)      History of present illness:  Thank you for asking me to see your patient.  The patient is a 53 y.o. female with anxiety difficulty concentrating hypertension presents to concern for sleep disorder.  Current medications include amlodipine and Trintellix.  No history of prior sleep study or tonsillectomy.  Difficulty falling asleep and staying asleep 0 naps no rotating shifts.  Reports resulting hypersomnia nonrestorative sleep see p.o. episodes restless sleep and nocturia.  BMI 25.9. Has tried melatonin, Restoril, Mirtazapine, Quviviq all which did not help. Took her sister's Klonopin this weekend and sleep latency was 30 min and slept 7 hours.     Social History: No tobacco alcohol caffeine or drug use    Allergies:  Ciprofloxacin and Penicillins    Family History: ZAC no       Current Outpatient Medications:     amLODIPine (NORVASC) 5 MG tablet, Take 1 tablet by mouth Daily., Disp: , Rfl:     Trintellix 10 MG tablet tablet, Take 1 tablet by mouth Daily With Breakfast., Disp: , Rfl:     clonazePAM (KlonoPIN) 0.5 MG disintegrating tablet, Place 1 tablet on the tongue Every Night., Disp: 30 tablet, Rfl: 0    docusate sodium (COLACE) 100 MG capsule, Take 1 capsule by mouth 2 (Two) Times a Day. (Patient not taking: Reported on 11/13/2023), Disp: , Rfl:     pantoprazole (Protonix) 20 MG EC tablet, Take 1 tablet by mouth Daily. (Patient not taking: Reported on 11/13/2023), Disp: 30 tablet, Rfl: 0    Vital Signs:    Vitals:    11/13/23 1300   BP: 122/70   BP Location: Left arm   Patient Position: Sitting   Pulse:  "87   SpO2: 99%   Weight: 77.4 kg (170 lb 9.6 oz)   Height: 172.7 cm (68\")      Body mass index is 25.94 kg/m².  Neck Circumference: 15 inches      REVIEW OF SYSTEMS.  Full review of systems available on the intake form which is scanned in the media tab.  The relevant positive are noted below  Daytime excessive sleepiness with Hunt Sleepiness Scale :Total score: 0   Snoring  Anxiety depression dizziness      Physical exam:  Vitals:    11/13/23 1300   BP: 122/70   BP Location: Left arm   Patient Position: Sitting   Pulse: 87   SpO2: 99%   Weight: 77.4 kg (170 lb 9.6 oz)   Height: 172.7 cm (68\")    Body mass index is 25.94 kg/m². Neck Circumference: 15 inches  HEENT: Head is atraumatic, normocephalic  Eyes: pupils are round equal and reacting to light and accommodation, conjunctiva normal  Throat: tongue normal  NECK:Neck Circumference: 15 inches  RESPIRATORY SYSTEM: Regular respirations  CARDIOVASULAR SYSTEM: Regular rate  EXTREMITES: No cyanosis, clubbing  NEUROLOGICAL SYSTEM: Oriented x 3, no gross motor defects, gait normal      Impression:  1. Insomnia, unspecified type    2. Non-restorative sleep    3. Hypersomnia    4. Overweight with body mass index (BMI) 25.0-29.9    5. Anxiety with depression        Plan:    Good sleep hygiene measures should be maintained.  Weight loss would be beneficial in this patient who is overweight BMI 25.9.    I discussed the pathophysiology of obstructive sleep apnea with the patient.  We discussed the adverse outcomes associated with untreated sleep-disordered breathing.  Sleep study will be scheduled to establish definitive diagnosis of sleep disorder breathing.  Weight loss will be strongly beneficial in order to reduce the severity of sleep-disordered breathing. Caution during activities that require prolonged concentration is strongly advised.  Patient will be notified of sleep study results after sleep study is completed.  If positive for ZAC return to clinic to discuss " results and treatment options.    In the meantime we need to address her insomnia which is significant.  Has tried clonazepam in the past and it did help.  This was prescribed for her in the past when she was in a treatment facility for depression.  Was not continued however long-term.    Patient does feel her insomnia does contribute to her anxiety and depression and vice versa.  Discussed how untreated sleep apnea can contribute to insomnia therefore we will follow-up home sleep study with hopes that clonazepam will help with sleep efficiency in the meantime.  We are also referring her to Dr. Minesh Johnson to complete CBT-I given longstanding insomnia.  We discussed clonazepam is not meant for long-term use and ideally treating sleep apnea will help resolve the insomnia or if there is no sleep apnea completing CBT-I will help with insomnia while she also continues care for her anxiety and depression.    Patient does report history of suicide attempt.  Reports she has a strong support system in friends and family who she would go to if she had such thoughts again.  Today denies any suicidal or homicidal ideation.  We also discussed how seasonal affect of disorder could be contributing to her mood.  We discussed light box for 10 to 15 minutes every morning to help with this.    Return to clinic in 1 month for follow-up or sooner if needed.    Thank you for allowing me to participate in your patient's care.    Naveen Baker MD  Sleep Medicine  11/13/23  14:33 EST

## 2023-11-16 ENCOUNTER — OFFICE VISIT (OUTPATIENT)
Dept: FAMILY MEDICINE CLINIC | Facility: CLINIC | Age: 53
End: 2023-11-16
Payer: COMMERCIAL

## 2023-11-16 VITALS
DIASTOLIC BLOOD PRESSURE: 83 MMHG | RESPIRATION RATE: 18 BRPM | BODY MASS INDEX: 25.13 KG/M2 | TEMPERATURE: 98.7 F | HEIGHT: 68 IN | OXYGEN SATURATION: 99 % | HEART RATE: 79 BPM | WEIGHT: 165.8 LBS | SYSTOLIC BLOOD PRESSURE: 134 MMHG

## 2023-11-16 DIAGNOSIS — R10.12 LUQ ABDOMINAL PAIN: Primary | ICD-10-CM

## 2023-11-16 DIAGNOSIS — G47.00 INSOMNIA, UNSPECIFIED TYPE: ICD-10-CM

## 2023-11-16 DIAGNOSIS — F41.9 ANXIETY AND DEPRESSION: ICD-10-CM

## 2023-11-16 DIAGNOSIS — Z23 INFLUENZA VACCINE ADMINISTERED: ICD-10-CM

## 2023-11-16 DIAGNOSIS — F32.A ANXIETY AND DEPRESSION: ICD-10-CM

## 2023-11-16 NOTE — PROGRESS NOTES
Subjective   Yuki Fermin is a 53 y.o. female.     History of Present Illness  Pt presents to follow up on her insomnia.  She went to see a sleep specialist and they prescribed klonopin but she doesn't want to take it.  She saw psychiatric nurse practitioner yesterday.  She wants her to switch back to lexapro. She is going to pick that up from the pharmacy today.She is still severely depressed.  She sees her again next Monday.    Continues to have nausea and upper abdominal pain.  She did have her colonoscopy with GI recently.  She hasn't had upper endoscopy.         Past Medical History:   Diagnosis Date    Anxiety     Colon polyp 2019    Depression     Encounter for screening colonoscopy 09/28/2023    Hypertension June 2022    Irritable bowel syndrome 1994    Palpitations      Past Surgical History:   Procedure Laterality Date    CERVICAL BIOPSY  W/ LOOP ELECTRODE EXCISION      COLONOSCOPY  2019    COLONOSCOPY N/A 11/02/2023    Procedure: COLONOSCOPY to cecum with snare,bx polypectomy;  Surgeon: Keenan Yap MD;  Location: [facility] [place];  Service: Gastroenterology;  Laterality: N/A;  polyps, hemorrhoids    RHINOPLASTY       Family History   Problem Relation Age of Onset    Colon polyps Sister     Arthritis Sister     Hypertension Sister     Colon polyps Brother     Hypertension Brother     Anxiety disorder Sister     Hypertension Sister     Sleep disorder Sister     Depression Sister     Diabetes Sister     Hypertension Sister     Hypertension Brother     Sleep apnea Brother     Hypertension Brother     Colon cancer Neg Hx     Crohn's disease Neg Hx     Irritable bowel syndrome Neg Hx     Ulcerative colitis Neg Hx      Social History     Socioeconomic History    Marital status:    Tobacco Use    Smoking status: Never     Passive exposure: Never    Smokeless tobacco: Never   Vaping Use    Vaping Use: Never used   Substance and Sexual Activity    Alcohol use: Not Currently    Drug use: Never     "Sexual activity: Not Currently     Partners: Male     Birth control/protection: Condom         Current Outpatient Medications:     amLODIPine (NORVASC) 5 MG tablet, Take 1 tablet by mouth Daily., Disp: , Rfl:     Trintellix 10 MG tablet tablet, Take 1 tablet by mouth Daily With Breakfast., Disp: , Rfl:     clonazePAM (KlonoPIN) 0.5 MG disintegrating tablet, Place 1 tablet on the tongue Every Night. (Patient not taking: Reported on 11/16/2023), Disp: 30 tablet, Rfl: 0    docusate sodium (COLACE) 100 MG capsule, Take 1 capsule by mouth 2 (Two) Times a Day. (Patient not taking: Reported on 11/13/2023), Disp: , Rfl:     pantoprazole (Protonix) 20 MG EC tablet, Take 1 tablet by mouth Daily. (Patient not taking: Reported on 11/13/2023), Disp: 30 tablet, Rfl: 0    Review of Systems   Constitutional:  Positive for fatigue. Negative for activity change, appetite change, chills, diaphoresis, fever, unexpected weight gain and unexpected weight loss.   HENT:  Negative for trouble swallowing.    Respiratory:  Negative for chest tightness, shortness of breath and wheezing.    Cardiovascular:  Negative for chest pain, palpitations and leg swelling.   Gastrointestinal:  Positive for abdominal pain, constipation, diarrhea and nausea. Negative for anal bleeding, blood in stool, vomiting, GERD and indigestion.   Musculoskeletal:  Negative for gait problem.   Neurological:  Negative for tremors, syncope and weakness.   Psychiatric/Behavioral:  Positive for dysphoric mood, sleep disturbance and depressed mood. Negative for self-injury and suicidal ideas.      /83 (BP Location: Left arm, Patient Position: Standing, Cuff Size: Large Adult)   Pulse 79   Temp 98.7 °F (37.1 °C) (Temporal)   Resp 18   Ht 172.7 cm (67.99\")   Wt 75.2 kg (165 lb 12.8 oz)   LMP 12/06/2017   SpO2 99%   BMI 25.22 kg/m²       Objective   Physical Exam  Constitutional:       General: She is not in acute distress.     Appearance: Normal appearance. She " is normal weight.   HENT:      Head: Normocephalic and atraumatic.   Neck:      Thyroid: No thyroid mass, thyromegaly or thyroid tenderness.   Cardiovascular:      Rate and Rhythm: Normal rate and regular rhythm.      Heart sounds: Normal heart sounds. No murmur heard.  Pulmonary:      Effort: Pulmonary effort is normal. No respiratory distress.      Breath sounds: Normal breath sounds. No wheezing, rhonchi or rales.   Abdominal:      General: Abdomen is flat. Bowel sounds are normal. There is no distension.      Palpations: Abdomen is soft. There is no mass.      Tenderness: There is abdominal tenderness in the left upper quadrant.   Musculoskeletal:      Cervical back: Normal range of motion and neck supple.      Right lower leg: No edema.      Left lower leg: No edema.   Skin:     General: Skin is warm and dry.   Neurological:      General: No focal deficit present.      Mental Status: She is alert and oriented to person, place, and time.   Psychiatric:         Mood and Affect: Mood normal.         Behavior: Behavior normal.         Thought Content: Thought content normal.         Procedures     Assessment    Diagnoses and all orders for this visit:    1. LUQ abdominal pain (Primary)  Comments:  Will check CT scan to evaluate pancreas and spleen.  Referral entered to gastroenterology.  He may need upper endoscopy if symptoms continue.  Orders:  -     CT Abdomen Pelvis With Contrast; Future  -     Ambulatory Referral to Gastroenterology    2. Insomnia, unspecified type  Comments:  Magnesium as recommended by psychiatry.    3. Anxiety and depression  Comments:  Switch to Lexapro as prescribed by psychiatry.  Follow-up with psychiatry.    4. Influenza vaccine administered  Comments:  Flu vaccine updated today.  Orders:  -     Fluzone >6 Months ([id number])       Encourage patient to get mammogram done as previously ordered but she is not wanting to do that at this time.

## 2023-11-28 ENCOUNTER — HOSPITAL ENCOUNTER (OUTPATIENT)
Dept: CT IMAGING | Facility: HOSPITAL | Age: 53
Discharge: HOME | End: 2023-11-28
Admitting: PHYSICIAN ASSISTANT
Payer: COMMERCIAL

## 2023-11-28 DIAGNOSIS — R10.12 LUQ ABDOMINAL PAIN: ICD-10-CM

## 2023-11-28 LAB — CREAT BLDA-MCNC: 0.7 MG/DL (ref 0.6–1.3)

## 2023-11-28 PROCEDURE — 25510000001 IOPAMIDOL 61 % SOLUTION: Performed by: PHYSICIAN ASSISTANT

## 2023-11-28 PROCEDURE — 74177 CT ABD & PELVIS W/CONTRAST: CPT

## 2023-11-28 PROCEDURE — 0 DIATRIZOATE MEGLUMINE & SODIUM PER 1 ML: Performed by: PHYSICIAN ASSISTANT

## 2023-11-28 PROCEDURE — 82565 ASSAY OF CREATININE: CPT

## 2023-11-28 RX ADMIN — IOPAMIDOL 100 ML: 612 INJECTION, SOLUTION INTRAVENOUS at 16:34

## 2023-11-28 RX ADMIN — DIATRIZOATE MEGLUMINE AND DIATRIZOATE SODIUM 30 ML: 660; 100 LIQUID ORAL; RECTAL at 15:30

## 2023-11-29 ENCOUNTER — TELEPHONE (OUTPATIENT)
Dept: FAMILY MEDICINE CLINIC | Facility: CLINIC | Age: 53
End: 2023-11-29

## 2023-11-29 NOTE — TELEPHONE ENCOUNTER
Caller: Yuki Fermin    Relationship: Self    Best call back number: [phone]  What is the best time to reach you: ANYTIME     Who are you requesting to speak with (clinical staff, provider,  specific staff member): CLINICAL STAFF     What was the call regarding: PATIENT WOULD LIKE A CALL BACK TO DISCUSS HER NEXT STEPS FOLLOWING HER RECENT CT SCAN.     PLEASE ADVISE

## 2023-12-06 ENCOUNTER — TELEPHONE (OUTPATIENT)
Dept: FAMILY MEDICINE CLINIC | Facility: CLINIC | Age: 53
End: 2023-12-06
Payer: COMMERCIAL

## 2023-12-06 ENCOUNTER — OFFICE VISIT (OUTPATIENT)
Dept: OBSTETRICS AND GYNECOLOGY | Facility: CLINIC | Age: 53
End: 2023-12-06
Payer: COMMERCIAL

## 2023-12-06 VITALS
SYSTOLIC BLOOD PRESSURE: 130 MMHG | HEIGHT: 67 IN | BODY MASS INDEX: 25.27 KG/M2 | DIASTOLIC BLOOD PRESSURE: 72 MMHG | WEIGHT: 161 LBS

## 2023-12-06 DIAGNOSIS — Z78.0 MENOPAUSE PRESENT: Primary | ICD-10-CM

## 2023-12-06 DIAGNOSIS — K58.8 OTHER IRRITABLE BOWEL SYNDROME: ICD-10-CM

## 2023-12-06 DIAGNOSIS — R10.12 LUQ PAIN: ICD-10-CM

## 2023-12-06 RX ORDER — BREXPIPRAZOLE 0.5 MG/1
TABLET ORAL
COMMUNITY
Start: 2023-12-05

## 2023-12-06 RX ORDER — ESCITALOPRAM OXALATE 20 MG/1
TABLET ORAL
COMMUNITY
Start: 2023-11-27

## 2023-12-06 NOTE — TELEPHONE ENCOUNTER
She has an appointment on 12/18 with you already scheduled.  I will let her know to bring the information from her psychiatrist to that visit.

## 2023-12-06 NOTE — PROGRESS NOTES
"EVALUATION AND MANAGEMENT ENCOUNTER    S:  Chief Complaint   Patient presents with    Follow-up     CT results       HPI:  Yuki Fermin is a 53 y.o. No obstetric history on file. with Patient's last menstrual period was 12/06/2017. here for CT scan review. Scan done for LUQ pain.    CT scan results:      IMPRESSION:  1. Moderate colonic stool burden     2. Left adnexal cyst. Consider ultrasound for further evaluation if  clinically indicated.    This was ordered by her PCP PA for LUQ pain:    1. LUQ abdominal pain (Primary)  Comments:  Will check CT scan to evaluate pancreas and spleen.  Referral entered to gastroenterology.  He may need upper endoscopy if symptoms continue.  Orders:  -     CT Abdomen Pelvis With Contrast; Future  -     Ambulatory Referral to Gastroenterology    Review of Systems   Constitutional: Negative.    HENT: Negative.     Eyes: Negative.    Respiratory: Negative.     Cardiovascular: Negative.    Gastrointestinal:  Positive for abdominal pain.   Endocrine: Negative.    Musculoskeletal: Negative.    Skin: Negative.    Allergic/Immunologic: Negative.    Neurological: Negative.    Hematological: Negative.    Psychiatric/Behavioral: Negative.     :    Patient reports that she is not currently experiencing any symptoms of urinary incontinence.      noTESTED FOR CHLAMYDIA?    Yuki Fermin  reports that she has never smoked. She has never been exposed to tobacco smoke. She has never used smokeless tobacco..       Vital Signs: /72   Ht 170.2 cm (67\")   Wt 73 kg (161 lb)   LMP 12/06/2017   Breastfeeding No   BMI 25.22 kg/m²      Brief Urine Lab Results       None            Physical Exam  Vitals and nursing note reviewed.   Constitutional:       Appearance: She is well-developed.   HENT:      Head: Normocephalic and atraumatic.   Cardiovascular:      Rate and Rhythm: Normal rate.   Pulmonary:      Effort: Pulmonary effort is normal.   Abdominal:      General: There is no " distension.      Palpations: Abdomen is soft. There is no mass.      Tenderness: There is no abdominal tenderness. There is no guarding.   Genitourinary:     Vagina: No vaginal discharge.   Musculoskeletal:         General: No tenderness or deformity. Normal range of motion.      Cervical back: Normal range of motion.   Skin:     General: Skin is warm and dry.      Coloration: Skin is not pale.      Findings: No erythema or rash.   Neurological:      Mental Status: She is alert and oriented to person, place, and time.   Psychiatric:         Behavior: Behavior normal.         Thought Content: Thought content normal.         Judgment: Judgment normal.           IMPRESSION:      Diagnoses and all orders for this visit:    1. Menopause present (Primary)    2. LUQ pain    3. Other irritable bowel syndrome      Pt was seen by me presumably for the small ovarian cyst noted on the CT scan.  Pt is totally asymptomatic from this, and I recommended a f/u u/s in 6 months.    PLAN:      U/s 6 months    Pt instructed to call for results of any testing done today if she does not hear from us, and that failure to do so could result in inadequate treatment . Pt verbalized her understanding.     No follow-ups on file..  Instructions and precautions given.     Time Spent: I spent 20+ minutes caring for Yuki on this date of service. This time includes time spent by me in the following activities: preparing for the visit, reviewing tests, obtaining and/or reviewing a separately obtained history, performing a medically appropriate examination and/or evaluation, counseling and educating the patient/family/caregiver, ordering medications, tests, or procedures, referring and communicating with other health care professionals, documenting information in the medical record, independently interpreting results and communicating that information with the patient/family/caregiver, and care coordination.      Juan Elmore MD  07:06  EST   12/22/23

## 2023-12-06 NOTE — TELEPHONE ENCOUNTER
Yuki called today because her phychiatric doctor (she pays out of pocket to see) recommended that she have a brain MRI.  She needs you to put the referral in for her so that insurance will cover it.  Is that something you can do for her?

## 2023-12-14 ENCOUNTER — HOSPITAL ENCOUNTER (OUTPATIENT)
Dept: SLEEP MEDICINE | Facility: HOSPITAL | Age: 53
Discharge: HOME | End: 2023-12-14
Admitting: FAMILY MEDICINE
Payer: COMMERCIAL

## 2023-12-14 DIAGNOSIS — G47.8 NON-RESTORATIVE SLEEP: ICD-10-CM

## 2023-12-14 DIAGNOSIS — G47.10 HYPERSOMNIA: ICD-10-CM

## 2023-12-14 DIAGNOSIS — E66.3 OVERWEIGHT WITH BODY MASS INDEX (BMI) 25.0-29.9: ICD-10-CM

## 2023-12-14 DIAGNOSIS — G47.00 INSOMNIA, UNSPECIFIED TYPE: ICD-10-CM

## 2023-12-14 PROCEDURE — 95806 SLEEP STUDY UNATT&RESP EFFT: CPT

## 2023-12-18 ENCOUNTER — OFFICE VISIT (OUTPATIENT)
Dept: FAMILY MEDICINE CLINIC | Facility: CLINIC | Age: 53
End: 2023-12-18
Payer: COMMERCIAL

## 2023-12-18 VITALS
WEIGHT: 163.4 LBS | HEART RATE: 83 BPM | BODY MASS INDEX: 25.65 KG/M2 | RESPIRATION RATE: 18 BRPM | HEIGHT: 67 IN | SYSTOLIC BLOOD PRESSURE: 129 MMHG | OXYGEN SATURATION: 99 % | TEMPERATURE: 98.6 F | DIASTOLIC BLOOD PRESSURE: 84 MMHG

## 2023-12-18 DIAGNOSIS — E78.2 MIXED HYPERLIPIDEMIA: ICD-10-CM

## 2023-12-18 DIAGNOSIS — F32.2 SEVERE MAJOR DEPRESSION WITHOUT PSYCHOTIC FEATURES: Primary | ICD-10-CM

## 2023-12-18 DIAGNOSIS — Z91.51 PREVIOUS KNOWN SUICIDE ATTEMPT: ICD-10-CM

## 2023-12-18 PROCEDURE — 99213 OFFICE O/P EST LOW 20 MIN: CPT | Performed by: PHYSICIAN ASSISTANT

## 2023-12-18 NOTE — PROGRESS NOTES
Subjective   Yuki Fermin is a 53 y.o. female.     History of Present Illness  Pt presents to follow up on her major depressive disorder. She is seeing [facility] who tried to order a brain MRI to rule out any brain injury due to prolonged anoxia subsequent to a nearly successful intentional overdose in 2003 or a possible brain lesion.  She did have a CT scan of her brain which was normal but there have been cases where things have been missed with CT scan vs MRI.  She continues to have severe depression recalcitrant to almost every available treatment (full round of ECT with ketamine infusions, over 15 antidepressant medications, multiple inpatient/residential treatment facility stays, and dialectical behavioral therapy).         Past Medical History:   Diagnosis Date    Anxiety     Colon polyp 2019    Depression     Encounter for screening colonoscopy 09/28/2023    Hypertension June 2022    Irritable bowel syndrome 1994    Palpitations      Past Surgical History:   Procedure Laterality Date    CERVICAL BIOPSY  W/ LOOP ELECTRODE EXCISION      COLONOSCOPY  2019    COLONOSCOPY N/A 11/02/2023    Procedure: COLONOSCOPY to cecum with snare,bx polypectomy;  Surgeon: Keenan Yap MD;  Location: [facility] [place]IN OR;  Service: Gastroenterology;  Laterality: N/A;  polyps, hemorrhoids    RHINOPLASTY       Family History   Problem Relation Age of Onset    Colon polyps Sister     Arthritis Sister     Hypertension Sister     Colon polyps Brother     Hypertension Brother     Anxiety disorder Sister     Hypertension Sister     Sleep disorder Sister     Depression Sister     Diabetes Sister     Hypertension Sister     Hypertension Brother     Sleep apnea Brother     Hypertension Brother     Colon cancer Neg Hx     Crohn's disease Neg Hx     Irritable bowel syndrome Neg Hx     Ulcerative colitis Neg Hx      Social History     Socioeconomic History    Marital status:    Tobacco Use    Smoking status: Never      "Passive exposure: Never    Smokeless tobacco: Never   Vaping Use    Vaping Use: Never used   Substance and Sexual Activity    Alcohol use: Not Currently    Drug use: Never    Sexual activity: Not Currently     Partners: Male     Birth control/protection: Condom         Current Outpatient Medications:     amLODIPine (NORVASC) 5 MG tablet, Take 1 tablet by mouth Daily., Disp: , Rfl:     Brexpiprazole (Rexulti) 0.5 MG tablet, , Disp: , Rfl:     escitalopram (Lexapro) 20 MG tablet, , Disp: , Rfl:     Review of Systems   Constitutional:  Negative for activity change, appetite change, chills, diaphoresis, fatigue, fever, unexpected weight gain and unexpected weight loss.   HENT:  Negative for trouble swallowing.    Eyes:  Negative for blurred vision and visual disturbance.   Respiratory:  Negative for chest tightness, shortness of breath and wheezing.    Cardiovascular:  Negative for chest pain, palpitations and leg swelling.   Gastrointestinal:  Negative for nausea and vomiting.   Musculoskeletal:  Negative for gait problem.   Neurological:  Negative for dizziness, tremors, syncope, weakness, light-headedness, headache and confusion.   Psychiatric/Behavioral:  Positive for sleep disturbance and depressed mood. Negative for self-injury, suicidal ideas and stress. The patient is not nervous/anxious.      /84 (BP Location: Left arm, Patient Position: Sitting, Cuff Size: Adult)   Pulse 83   Temp 98.6 °F (37 °C) (Temporal)   Resp 18   Ht 170.2 cm (67.01\")   Wt 74.1 kg (163 lb 6.4 oz)   LMP 12/06/2017   SpO2 99%   BMI 25.59 kg/m²       Objective   Physical Exam  Vitals and nursing note reviewed.   Constitutional:       Appearance: Normal appearance. She is normal weight.   HENT:      Head: Normocephalic and atraumatic.      Mouth/Throat:      Mouth: Mucous membranes are moist.      Pharynx: Oropharynx is clear.   Eyes:      Extraocular Movements: Extraocular movements intact.      Pupils: Pupils are equal, round, " and reactive to light.   Neck:      Thyroid: No thyroid mass, thyromegaly or thyroid tenderness.   Cardiovascular:      Rate and Rhythm: Normal rate and regular rhythm.      Heart sounds: Normal heart sounds.   Pulmonary:      Effort: Pulmonary effort is normal.      Breath sounds: Normal breath sounds.   Musculoskeletal:         General: Normal range of motion.      Cervical back: Normal range of motion and neck supple.   Skin:     General: Skin is warm and dry.   Neurological:      General: No focal deficit present.      Mental Status: She is alert and oriented to person, place, and time.      Cranial Nerves: No cranial nerve deficit.      Sensory: No sensory deficit.      Motor: No weakness.      Coordination: Coordination normal.      Gait: Gait normal.   Psychiatric:         Mood and Affect: Mood is depressed.         Behavior: Behavior normal.         Thought Content: Thought content normal.         Procedures     Assessment    Diagnoses and all orders for this visit:    1. Severe major depression without psychotic features (Primary)  Comments:  Continue current regimen and continue follow up with psych.  Will order brain MRI to ensure no underlying pathology since pt has been refractory to treatment.  Orders:  -     MRI Brain Without Contrast; Future    2. Previous known suicide attempt  Comments:  Will check MRI brain for any signs of anoxic injury from previous suidical attempt.  Orders:  -     MRI Brain Without Contrast; Future    3. Mixed hyperlipidemia  Comments:  Will check fasting labs. Work on high fiber and low saturated fat diet.  If she requires medication, she would like combo pill with her amlodipine.

## 2023-12-22 PROBLEM — R10.12 LUQ PAIN: Status: ACTIVE | Noted: 2023-12-22

## 2023-12-27 DIAGNOSIS — G47.00 INSOMNIA, UNSPECIFIED TYPE: Primary | ICD-10-CM

## 2024-01-11 DIAGNOSIS — R73.09 ELEVATED HEMOGLOBIN A1C: Primary | ICD-10-CM

## 2024-01-11 DIAGNOSIS — E78.00 HYPERCHOLESTEROLEMIA: ICD-10-CM

## 2024-01-30 ENCOUNTER — TELEPHONE (OUTPATIENT)
Dept: FAMILY MEDICINE CLINIC | Facility: CLINIC | Age: 54
End: 2024-01-30

## 2024-01-30 NOTE — TELEPHONE ENCOUNTER
Caller: Yuki Fermin    Relationship: Self    Best call back number: [phone]    What is the best time to reach you: ANYTIME     Who are you requesting to speak with (clinical staff, provider,  specific staff member): TRIP     What was the call regarding: PATIENT STATES HER MRI WAS DENIED BY INSURANCE AND IS WANTING TO KNOW IF TRIPPRIYA ESCAMILLA CAN FILE AN APPEAL AND DO A PEER TO PEER CALL.    PEER TO PEER PHONE NUMBER: [phone]    PATIENT IS REQUESTING A CALL BACK.

## 2024-01-31 ENCOUNTER — TELEPHONE (OUTPATIENT)
Dept: FAMILY MEDICINE CLINIC | Facility: CLINIC | Age: 54
End: 2024-01-31
Payer: COMMERCIAL

## 2024-01-31 NOTE — TELEPHONE ENCOUNTER
Pt called 01/31/2024 and state that she already had a Ct scan done and would like to she what else she do can do about about doing her MRI. IT  was denied but, will like to appeal it

## 2024-02-05 ENCOUNTER — OFFICE VISIT (OUTPATIENT)
Dept: FAMILY MEDICINE CLINIC | Facility: CLINIC | Age: 54
End: 2024-02-05
Payer: COMMERCIAL

## 2024-02-05 VITALS
OXYGEN SATURATION: 100 % | TEMPERATURE: 97.8 F | SYSTOLIC BLOOD PRESSURE: 119 MMHG | HEIGHT: 67 IN | WEIGHT: 167.6 LBS | RESPIRATION RATE: 18 BRPM | DIASTOLIC BLOOD PRESSURE: 75 MMHG | HEART RATE: 75 BPM | BODY MASS INDEX: 26.3 KG/M2

## 2024-02-05 DIAGNOSIS — R20.2 PARESTHESIAS: ICD-10-CM

## 2024-02-05 DIAGNOSIS — R51.9 FREQUENT HEADACHES: ICD-10-CM

## 2024-02-05 DIAGNOSIS — R47.89 WORD FINDING DIFFICULTY: ICD-10-CM

## 2024-02-05 DIAGNOSIS — F32.2 SEVERE MAJOR DEPRESSION WITHOUT PSYCHOTIC FEATURES: Primary | ICD-10-CM

## 2024-02-05 DIAGNOSIS — Z12.31 SCREENING MAMMOGRAM FOR BREAST CANCER: ICD-10-CM

## 2024-02-05 PROCEDURE — 99214 OFFICE O/P EST MOD 30 MIN: CPT | Performed by: PHYSICIAN ASSISTANT

## 2024-02-05 RX ORDER — METFORMIN HYDROCHLORIDE 500 MG/1
500 TABLET, EXTENDED RELEASE ORAL
COMMUNITY
Start: 2024-01-22

## 2024-02-05 NOTE — PROGRESS NOTES
Subjective   Yuki Fermin is a 53 y.o. female.     History of Present Illness  Pt presents to follow up on her depression and discuss need for MRI.  She has been seeing [facility] for her mental health who had sent me a letter requesting an MRI brain to be ordered without contrast to rule out any organic cause of her ongoing, severe symptoms, which have recalcitrant to almost every available treatment.  She has had a full round of ECT with ketamine infusions, over 15 antidepressant medications, multiple inpatient/residential treatment facility stays and behavioral therapy.  She previously had a CT brain performed in the past but was reported as unremarkable. This was done at [facility] last year she thinks.    She does sometimes get headaches. She does sometimes feel that she understands things and knows what she wants to say but she can't get it to come out how she wants it to.  She feels like her memory is ok but just has problems speaking at times.  She does complain of paresthesias in hands and feet at times. She denies any recent suicidal attempt.  She continues to have daily suicidal thoughts.  Still not sleeping well.  She is doing art therapy, group therapy and her individual therapy.           Past Medical History:   Diagnosis Date    Anxiety     Colon polyp 2019    Depression     Encounter for screening colonoscopy 09/28/2023    Hypertension June 2022    Irritable bowel syndrome 1994    Palpitations      Past Surgical History:   Procedure Laterality Date    CERVICAL BIOPSY  W/ LOOP ELECTRODE EXCISION      COLONOSCOPY  2019    COLONOSCOPY N/A 11/02/2023    Procedure: COLONOSCOPY to cecum with snare,bx polypectomy;  Surgeon: Keenan Yap MD;  Location: [facility] [place] MAIN OR;  Service: Gastroenterology;  Laterality: N/A;  polyps, hemorrhoids    RHINOPLASTY       Family History   Problem Relation Age of Onset    Colon polyps Sister     Arthritis Sister     Hypertension Sister     Colon polyps  Brother     Hypertension Brother     Anxiety disorder Sister     Hypertension Sister     Sleep disorder Sister     Depression Sister     Diabetes Sister     Hypertension Sister     Hypertension Brother     Sleep apnea Brother     Hypertension Brother     Colon cancer Neg Hx     Crohn's disease Neg Hx     Irritable bowel syndrome Neg Hx     Ulcerative colitis Neg Hx      Social History     Socioeconomic History    Marital status:    Tobacco Use    Smoking status: Never     Passive exposure: Never    Smokeless tobacco: Never   Vaping Use    Vaping Use: Never used   Substance and Sexual Activity    Alcohol use: Not Currently    Drug use: Never    Sexual activity: Not Currently     Partners: Male     Birth control/protection: Condom         Current Outpatient Medications:     amLODIPine (NORVASC) 5 MG tablet, Take 1 tablet by mouth Daily., Disp: , Rfl:     escitalopram (Lexapro) 20 MG tablet, , Disp: , Rfl:     metFORMIN ER (GLUCOPHAGE-XR) 500 MG 24 hr tablet, Take 1 tablet by mouth Daily With Breakfast., Disp: , Rfl:     Brexpiprazole (Rexulti) 0.5 MG tablet, , Disp: , Rfl:     Review of Systems   Constitutional:  Positive for fatigue. Negative for activity change, appetite change, chills, diaphoresis, fever, unexpected weight gain and unexpected weight loss.   HENT:  Negative for trouble swallowing.    Eyes:  Negative for blurred vision and visual disturbance.   Respiratory:  Negative for cough, chest tightness, shortness of breath and wheezing.    Cardiovascular:  Negative for chest pain, palpitations and leg swelling.   Gastrointestinal:  Negative for abdominal pain, nausea and vomiting.   Musculoskeletal:  Negative for gait problem.   Neurological:  Positive for speech difficulty, numbness and headache. Negative for dizziness, tremors, syncope, weakness, light-headedness and confusion.   Psychiatric/Behavioral:  Positive for sleep disturbance, suicidal ideas and depressed mood. Negative for self-injury and  "stress. The patient is not nervous/anxious.      /75 (BP Location: Left arm, Patient Position: Sitting, Cuff Size: Adult)   Pulse 75   Temp 97.8 °F (36.6 °C) (Temporal)   Resp 18   Ht 170.2 cm (67.01\")   Wt 76 kg (167 lb 9.6 oz)   LMP 12/06/2017   SpO2 100%   BMI 26.24 kg/m²       Objective   Physical Exam  Vitals and nursing note reviewed.   Constitutional:       Appearance: Normal appearance. She is normal weight.   HENT:      Head: Normocephalic and atraumatic.      Mouth/Throat:      Mouth: Mucous membranes are moist.      Pharynx: Oropharynx is clear.   Eyes:      Extraocular Movements: Extraocular movements intact.      Pupils: Pupils are equal, round, and reactive to light.   Neck:      Thyroid: No thyroid mass, thyromegaly or thyroid tenderness.      Vascular: No carotid bruit.   Cardiovascular:      Rate and Rhythm: Normal rate and regular rhythm.      Heart sounds: Normal heart sounds.   Pulmonary:      Effort: Pulmonary effort is normal.      Breath sounds: Normal breath sounds.   Musculoskeletal:         General: Normal range of motion.      Cervical back: Normal range of motion and neck supple.      Right lower leg: No edema.      Left lower leg: No edema.   Skin:     General: Skin is warm and dry.   Neurological:      General: No focal deficit present.      Mental Status: She is alert and oriented to person, place, and time.      Cranial Nerves: No cranial nerve deficit.      Motor: No weakness.      Coordination: Coordination normal.      Gait: Gait normal.   Psychiatric:         Mood and Affect: Mood normal.         Behavior: Behavior normal.         Thought Content: Thought content normal.         Procedures     Assessment    Diagnoses and all orders for this visit:    1. Severe major depression without psychotic features (Primary)  Comments:  Continue seeing psych. Will attempt to get MRI approved.  Orders:  -     MRI Brain With & Without Contrast; Future    2. Screening mammogram for " breast cancer  Comments:  Orders entered.  Orders:  -     Mammo Screening Digital Tomosynthesis Bilateral With CAD; Future    3. Word finding difficulty  Comments:  Will check MRI brain.  Orders:  -     MRI Brain With & Without Contrast; Future    4. Frequent headaches  Comments:  Will check brain MRI.  Has had normal CT head. Records requested.  Orders:  -     MRI Brain With & Without Contrast; Future    5. Paresthesias  Comments:  Paresthesias intermittently in hands and feet.  Will check MRI to rule out any brain pathology like MS.  Orders:  -     MRI Brain With & Without Contrast; Future                I spent 34 minutes caring for Yuki on this date of service. This time includes time spent by me in the following activities: preparing for the visit, reviewing tests, obtaining and/or reviewing a separately obtained history, performing a medically appropriate examination and/or evaluation, counseling and educating the patient/family/caregiver, and ordering medications, tests, or procedures.

## 2024-02-23 ENCOUNTER — TELEPHONE (OUTPATIENT)
Dept: FAMILY MEDICINE CLINIC | Facility: CLINIC | Age: 54
End: 2024-02-23
Payer: COMMERCIAL

## 2024-02-23 DIAGNOSIS — R47.89 WORD FINDING DIFFICULTY: Primary | ICD-10-CM

## 2024-02-23 DIAGNOSIS — F32.2 SEVERE MAJOR DEPRESSION WITHOUT PSYCHOTIC FEATURES: ICD-10-CM

## 2024-02-23 NOTE — TELEPHONE ENCOUNTER
When trying to get auth w insurance for pt it was saying it was duplicate since on e just went in for patient and it was denied. Here is what patient insurance said:      Before we can approve the request, these notes should be sent: doctor's notes that say other   basic tests were done first (neuropsychological testing). Those tests would show something   was not right. The results should also show why another test is needed. The notes we got did   not show these things have been done.   You should follow up with your doctor for the next step in your care.     Please advise it still will note let me get auth need other testing done 1st.

## 2024-02-23 NOTE — TELEPHONE ENCOUNTER
Please let pt know that her insurance denied the MRI brain until she has neuropsychological testing done. I will put in the referral but she will need to check with her insurance to see where she can go for this that is covered under her insurance since the only place I usually refer to doesn't take her insurance.

## (undated) DEVICE — FLEX ADVANTAGE 1500CC: Brand: FLEX ADVANTAGE

## (undated) DEVICE — Device

## (undated) DEVICE — CANN O2 ETCO2 FITS ALL CONN CO2 SMPL A/ 7IN DISP LF

## (undated) DEVICE — KT ORCA ORCAPOD DISP STRL

## (undated) DEVICE — GOWN ,SIRUS,NONREINFORCED 3XL: Brand: MEDLINE

## (undated) DEVICE — ADAPT CLN SCPE ENDO PORPOISE BX/50 DISP

## (undated) DEVICE — VIAL FORMLN CAP 10PCT 20ML

## (undated) DEVICE — SINGLE-USE BIOPSY FORCEPS: Brand: RADIAL JAW 4

## (undated) DEVICE — THE SINGLE USE ETRAP – POLYP TRAP IS USED FOR SUCTION RETRIEVAL OF ENDOSCOPICALLY REMOVED POLYPS.: Brand: ETRAP

## (undated) DEVICE — LASSO POLYPECTOMY SNARE: Brand: LASSO

## (undated) DEVICE — GOWN ISOL W/THUMB UNIV BLU BX/15